# Patient Record
Sex: MALE | Race: WHITE | NOT HISPANIC OR LATINO | Employment: OTHER | URBAN - NONMETROPOLITAN AREA
[De-identification: names, ages, dates, MRNs, and addresses within clinical notes are randomized per-mention and may not be internally consistent; named-entity substitution may affect disease eponyms.]

---

## 2021-06-07 DIAGNOSIS — I10 HYPERTENSION, UNSPECIFIED TYPE: Primary | ICD-10-CM

## 2021-06-16 ENCOUNTER — OFFICE VISIT (OUTPATIENT)
Dept: FAMILY MEDICINE | Facility: CLINIC | Age: 71
End: 2021-06-16
Payer: MEDICARE

## 2021-06-16 VITALS
WEIGHT: 213.63 LBS | TEMPERATURE: 97 F | HEART RATE: 67 BPM | BODY MASS INDEX: 28.93 KG/M2 | HEIGHT: 72 IN | SYSTOLIC BLOOD PRESSURE: 128 MMHG | DIASTOLIC BLOOD PRESSURE: 62 MMHG | RESPIRATION RATE: 18 BRPM | OXYGEN SATURATION: 96 %

## 2021-06-16 DIAGNOSIS — D69.6 THROMBOCYTOPENIA: ICD-10-CM

## 2021-06-16 DIAGNOSIS — I10 HYPERTENSION, UNSPECIFIED TYPE: Primary | ICD-10-CM

## 2021-06-16 DIAGNOSIS — H54.0X55 BLINDNESS RIGHT EYE CATEGORY 5, BLINDNESS LEFT EYE CATEGORY 5: ICD-10-CM

## 2021-06-16 PROCEDURE — 99213 PR OFFICE/OUTPT VISIT, EST, LEVL III, 20-29 MIN: ICD-10-PCS | Mod: ,,, | Performed by: FAMILY MEDICINE

## 2021-06-16 PROCEDURE — 99213 OFFICE O/P EST LOW 20 MIN: CPT | Mod: ,,, | Performed by: FAMILY MEDICINE

## 2021-06-16 RX ORDER — OMEPRAZOLE 20 MG/1
20 CAPSULE, DELAYED RELEASE ORAL DAILY
COMMUNITY
Start: 2021-03-22 | End: 2021-12-16 | Stop reason: SDUPTHER

## 2021-06-16 RX ORDER — FAMOTIDINE 10 MG/1
20 TABLET ORAL DAILY
COMMUNITY
End: 2021-12-16 | Stop reason: SDUPTHER

## 2021-06-16 RX ORDER — ASPIRIN 81 MG/1
81 TABLET ORAL DAILY
COMMUNITY

## 2021-06-16 RX ORDER — ALLOPURINOL 100 MG/1
100 TABLET ORAL 2 TIMES DAILY
COMMUNITY
Start: 2021-03-22 | End: 2021-12-16 | Stop reason: SDUPTHER

## 2021-06-16 RX ORDER — CETIRIZINE HYDROCHLORIDE 10 MG/1
10 TABLET ORAL DAILY
COMMUNITY
End: 2021-12-16 | Stop reason: SDUPTHER

## 2021-06-16 RX ORDER — METOPROLOL SUCCINATE 50 MG/1
1.5 TABLET, EXTENDED RELEASE ORAL DAILY
COMMUNITY
Start: 2021-03-22 | End: 2021-07-01 | Stop reason: SDUPTHER

## 2021-06-16 RX ORDER — ACETAMINOPHEN 500 MG
1 TABLET ORAL DAILY
COMMUNITY

## 2021-06-16 RX ORDER — BRIMONIDINE TARTRATE 1.5 MG/ML
SOLUTION/ DROPS OPHTHALMIC
COMMUNITY
Start: 2021-06-15 | End: 2023-10-10

## 2021-06-16 RX ORDER — ATORVASTATIN CALCIUM 40 MG/1
40 TABLET, FILM COATED ORAL DAILY
COMMUNITY
Start: 2021-05-14 | End: 2021-08-18

## 2021-07-01 RX ORDER — METOPROLOL SUCCINATE 50 MG/1
TABLET, EXTENDED RELEASE ORAL
Qty: 45 TABLET | Refills: 2 | Status: SHIPPED | OUTPATIENT
Start: 2021-07-01 | End: 2021-09-10 | Stop reason: SDUPTHER

## 2021-09-13 RX ORDER — METOPROLOL SUCCINATE 50 MG/1
TABLET, EXTENDED RELEASE ORAL
Qty: 45 TABLET | Refills: 2 | Status: SHIPPED | OUTPATIENT
Start: 2021-09-13 | End: 2021-12-16 | Stop reason: SDUPTHER

## 2021-09-16 ENCOUNTER — OFFICE VISIT (OUTPATIENT)
Dept: FAMILY MEDICINE | Facility: CLINIC | Age: 71
End: 2021-09-16
Payer: MEDICARE

## 2021-09-16 VITALS
BODY MASS INDEX: 28.31 KG/M2 | RESPIRATION RATE: 20 BRPM | SYSTOLIC BLOOD PRESSURE: 114 MMHG | HEIGHT: 72 IN | OXYGEN SATURATION: 100 % | HEART RATE: 74 BPM | WEIGHT: 209 LBS | DIASTOLIC BLOOD PRESSURE: 72 MMHG | TEMPERATURE: 98 F

## 2021-09-16 DIAGNOSIS — Z23 NEED FOR INFLUENZA VACCINATION: ICD-10-CM

## 2021-09-16 DIAGNOSIS — Z00.00 ENCOUNTER FOR SUBSEQUENT ANNUAL WELLNESS VISIT (AWV) IN MEDICARE PATIENT: Primary | ICD-10-CM

## 2021-09-16 DIAGNOSIS — Z12.5 ENCOUNTER FOR SCREENING FOR MALIGNANT NEOPLASM OF PROSTATE: ICD-10-CM

## 2021-09-16 PROCEDURE — 90686 IIV4 VACC NO PRSV 0.5 ML IM: CPT | Mod: ,,, | Performed by: FAMILY MEDICINE

## 2021-09-16 PROCEDURE — G0008 FLU VACCINE (QUAD) GREATER THAN OR EQUAL TO 3YO PRESERVATIVE FREE IM: ICD-10-PCS | Mod: ,,, | Performed by: FAMILY MEDICINE

## 2021-09-16 PROCEDURE — G0439 PPPS, SUBSEQ VISIT: HCPCS | Mod: ,,, | Performed by: FAMILY MEDICINE

## 2021-09-16 PROCEDURE — G0008 ADMIN INFLUENZA VIRUS VAC: HCPCS | Mod: ,,, | Performed by: FAMILY MEDICINE

## 2021-09-16 PROCEDURE — G0103 PSA SCREENING: HCPCS | Mod: ,,, | Performed by: CLINICAL MEDICAL LABORATORY

## 2021-09-16 PROCEDURE — 90686 FLU VACCINE (QUAD) GREATER THAN OR EQUAL TO 3YO PRESERVATIVE FREE IM: ICD-10-PCS | Mod: ,,, | Performed by: FAMILY MEDICINE

## 2021-09-16 PROCEDURE — G0103 PSA, SCREENING: ICD-10-PCS | Mod: ,,, | Performed by: CLINICAL MEDICAL LABORATORY

## 2021-09-16 PROCEDURE — G0439 PR MEDICARE ANNUAL WELLNESS SUBSEQUENT VISIT: ICD-10-PCS | Mod: ,,, | Performed by: FAMILY MEDICINE

## 2021-09-16 RX ORDER — OMEPRAZOLE 20 MG/1
20 CAPSULE, DELAYED RELEASE ORAL DAILY
COMMUNITY
End: 2021-12-16

## 2021-09-16 RX ORDER — VITAMIN E 200 UNIT
1 CAPSULE ORAL DAILY
COMMUNITY

## 2021-09-16 RX ORDER — ASCORBIC ACID 500 MG
500 TABLET ORAL DAILY
COMMUNITY

## 2021-09-16 RX ORDER — CALCIUM CARBONATE/VITAMIN D3 600 MG-10
30 TABLET ORAL DAILY
COMMUNITY

## 2021-09-16 RX ORDER — KETOROLAC TROMETHAMINE 5 MG/ML
1 SOLUTION OPHTHALMIC 4 TIMES DAILY PRN
COMMUNITY
Start: 2021-08-18

## 2021-09-16 RX ORDER — KETOCONAZOLE 20 MG/G
1 CREAM TOPICAL DAILY
COMMUNITY
Start: 2021-04-23 | End: 2021-11-08

## 2021-09-16 RX ORDER — WITCH HAZEL 50 %
2000 PADS, MEDICATED (EA) TOPICAL DAILY
COMMUNITY

## 2021-09-17 LAB — PSA SERPL-MCNC: 1.02 NG/ML (ref 0–4.4)

## 2021-12-16 ENCOUNTER — OFFICE VISIT (OUTPATIENT)
Dept: FAMILY MEDICINE | Facility: CLINIC | Age: 71
End: 2021-12-16
Payer: MEDICARE

## 2021-12-16 VITALS
HEIGHT: 72 IN | OXYGEN SATURATION: 95 % | HEART RATE: 69 BPM | TEMPERATURE: 98 F | DIASTOLIC BLOOD PRESSURE: 69 MMHG | RESPIRATION RATE: 18 BRPM | BODY MASS INDEX: 28.42 KG/M2 | WEIGHT: 209.81 LBS | SYSTOLIC BLOOD PRESSURE: 125 MMHG

## 2021-12-16 DIAGNOSIS — K21.9 GASTROESOPHAGEAL REFLUX DISEASE WITHOUT ESOPHAGITIS: ICD-10-CM

## 2021-12-16 DIAGNOSIS — J30.9 ALLERGIC RHINITIS, UNSPECIFIED SEASONALITY, UNSPECIFIED TRIGGER: ICD-10-CM

## 2021-12-16 DIAGNOSIS — I10 PRIMARY HYPERTENSION: Primary | ICD-10-CM

## 2021-12-16 DIAGNOSIS — M10.9 GOUT, UNSPECIFIED CAUSE, UNSPECIFIED CHRONICITY, UNSPECIFIED SITE: ICD-10-CM

## 2021-12-16 DIAGNOSIS — D69.6 THROMBOCYTOPENIA: ICD-10-CM

## 2021-12-16 DIAGNOSIS — I10 HYPERTENSION, UNSPECIFIED TYPE: ICD-10-CM

## 2021-12-16 DIAGNOSIS — H54.0X55 BLINDNESS RIGHT EYE CATEGORY 5, BLINDNESS LEFT EYE CATEGORY 5: ICD-10-CM

## 2021-12-16 LAB
ALBUMIN SERPL BCP-MCNC: 3.5 G/DL (ref 3.5–5)
ALBUMIN/GLOB SERPL: 1.1 {RATIO}
ALP SERPL-CCNC: 113 U/L (ref 45–115)
ALT SERPL W P-5'-P-CCNC: 26 U/L (ref 16–61)
ANION GAP SERPL CALCULATED.3IONS-SCNC: 8 MMOL/L (ref 7–16)
AST SERPL W P-5'-P-CCNC: 23 U/L (ref 15–37)
BASOPHILS # BLD AUTO: 0.05 K/UL (ref 0–0.2)
BASOPHILS NFR BLD AUTO: 0.7 % (ref 0–1)
BILIRUB SERPL-MCNC: 0.5 MG/DL (ref 0–1.2)
BUN SERPL-MCNC: 14 MG/DL (ref 7–18)
BUN/CREAT SERPL: 14 (ref 6–20)
CALCIUM SERPL-MCNC: 8.5 MG/DL (ref 8.5–10.1)
CHLORIDE SERPL-SCNC: 110 MMOL/L (ref 98–107)
CHOLEST SERPL-MCNC: 150 MG/DL (ref 0–200)
CHOLEST/HDLC SERPL: 6.3 {RATIO}
CO2 SERPL-SCNC: 28 MMOL/L (ref 21–32)
CREAT SERPL-MCNC: 1.01 MG/DL (ref 0.7–1.3)
DIFFERENTIAL METHOD BLD: ABNORMAL
EOSINOPHIL # BLD AUTO: 0.23 K/UL (ref 0–0.5)
EOSINOPHIL NFR BLD AUTO: 3.4 % (ref 1–4)
ERYTHROCYTE [DISTWIDTH] IN BLOOD BY AUTOMATED COUNT: 12.9 % (ref 11.5–14.5)
FOLATE SERPL-MCNC: 10.2 NG/ML (ref 3.1–17.5)
GLOBULIN SER-MCNC: 3.3 G/DL (ref 2–4)
GLUCOSE SERPL-MCNC: 103 MG/DL (ref 74–106)
HCT VFR BLD AUTO: 47.4 % (ref 40–54)
HDLC SERPL-MCNC: 24 MG/DL (ref 40–60)
HGB BLD-MCNC: 15.8 G/DL (ref 13.5–18)
IMM GRANULOCYTES # BLD AUTO: 0.02 K/UL (ref 0–0.04)
IMM GRANULOCYTES NFR BLD: 0.3 % (ref 0–0.4)
LDLC SERPL CALC-MCNC: ABNORMAL MG/DL
LDLC/HDLC SERPL: ABNORMAL {RATIO}
LYMPHOCYTES # BLD AUTO: 2 K/UL (ref 1–4.8)
LYMPHOCYTES NFR BLD AUTO: 29.7 % (ref 27–41)
MCH RBC QN AUTO: 30.7 PG (ref 27–31)
MCHC RBC AUTO-ENTMCNC: 33.3 G/DL (ref 32–36)
MCV RBC AUTO: 92.2 FL (ref 80–96)
MONOCYTES # BLD AUTO: 0.69 K/UL (ref 0–0.8)
MONOCYTES NFR BLD AUTO: 10.2 % (ref 2–6)
MPC BLD CALC-MCNC: 10.9 FL (ref 9.4–12.4)
NEUTROPHILS # BLD AUTO: 3.75 K/UL (ref 1.8–7.7)
NEUTROPHILS NFR BLD AUTO: 55.7 % (ref 53–65)
NONHDLC SERPL-MCNC: 126 MG/DL
NRBC # BLD AUTO: 0 X10E3/UL
NRBC, AUTO (.00): 0 %
PLATELET # BLD AUTO: 156 K/UL (ref 150–400)
POTASSIUM SERPL-SCNC: 4.3 MMOL/L (ref 3.5–5.1)
PROT SERPL-MCNC: 6.8 G/DL (ref 6.4–8.2)
RBC # BLD AUTO: 5.14 M/UL (ref 4.6–6.2)
SODIUM SERPL-SCNC: 142 MMOL/L (ref 136–145)
TRIGL SERPL-MCNC: 435 MG/DL (ref 35–150)
TSH SERPL DL<=0.005 MIU/L-ACNC: 2.82 UIU/ML (ref 0.36–3.74)
VIT B12 SERPL-MCNC: 893 PG/ML (ref 193–986)
VLDLC SERPL-MCNC: ABNORMAL MG/DL
WBC # BLD AUTO: 6.74 K/UL (ref 4.5–11)

## 2021-12-16 PROCEDURE — 82607 VITAMIN B-12: CPT | Mod: ,,, | Performed by: CLINICAL MEDICAL LABORATORY

## 2021-12-16 PROCEDURE — 99214 OFFICE O/P EST MOD 30 MIN: CPT | Mod: ,,, | Performed by: FAMILY MEDICINE

## 2021-12-16 PROCEDURE — 84443 ASSAY THYROID STIM HORMONE: CPT | Mod: ,,, | Performed by: CLINICAL MEDICAL LABORATORY

## 2021-12-16 PROCEDURE — 84443 TSH: ICD-10-PCS | Mod: ,,, | Performed by: CLINICAL MEDICAL LABORATORY

## 2021-12-16 PROCEDURE — 82746 VITAMIN B12/FOLATE, SERUM PANEL: ICD-10-PCS | Mod: ,,, | Performed by: CLINICAL MEDICAL LABORATORY

## 2021-12-16 PROCEDURE — 99214 PR OFFICE/OUTPT VISIT, EST, LEVL IV, 30-39 MIN: ICD-10-PCS | Mod: ,,, | Performed by: FAMILY MEDICINE

## 2021-12-16 PROCEDURE — 80053 COMPREHEN METABOLIC PANEL: CPT | Mod: ,,, | Performed by: CLINICAL MEDICAL LABORATORY

## 2021-12-16 PROCEDURE — 80053 COMPREHENSIVE METABOLIC PANEL: ICD-10-PCS | Mod: ,,, | Performed by: CLINICAL MEDICAL LABORATORY

## 2021-12-16 PROCEDURE — 85025 CBC WITH DIFFERENTIAL: ICD-10-PCS | Mod: ,,, | Performed by: CLINICAL MEDICAL LABORATORY

## 2021-12-16 PROCEDURE — 80061 LIPID PANEL: ICD-10-PCS | Mod: ,,, | Performed by: CLINICAL MEDICAL LABORATORY

## 2021-12-16 PROCEDURE — 82607 VITAMIN B12/FOLATE, SERUM PANEL: ICD-10-PCS | Mod: ,,, | Performed by: CLINICAL MEDICAL LABORATORY

## 2021-12-16 PROCEDURE — 85025 COMPLETE CBC W/AUTO DIFF WBC: CPT | Mod: ,,, | Performed by: CLINICAL MEDICAL LABORATORY

## 2021-12-16 PROCEDURE — 82746 ASSAY OF FOLIC ACID SERUM: CPT | Mod: ,,, | Performed by: CLINICAL MEDICAL LABORATORY

## 2021-12-16 PROCEDURE — 80061 LIPID PANEL: CPT | Mod: ,,, | Performed by: CLINICAL MEDICAL LABORATORY

## 2021-12-16 RX ORDER — ATORVASTATIN CALCIUM 40 MG/1
40 TABLET, FILM COATED ORAL DAILY
Qty: 90 TABLET | Refills: 1 | Status: SHIPPED | OUTPATIENT
Start: 2021-12-16 | End: 2021-12-20 | Stop reason: SDUPTHER

## 2021-12-16 RX ORDER — OMEPRAZOLE 20 MG/1
20 CAPSULE, DELAYED RELEASE ORAL DAILY
Qty: 90 CAPSULE | Refills: 1 | Status: SHIPPED | OUTPATIENT
Start: 2021-12-16 | End: 2022-06-16 | Stop reason: SDUPTHER

## 2021-12-16 RX ORDER — METOPROLOL SUCCINATE 50 MG/1
TABLET, EXTENDED RELEASE ORAL
Qty: 45 TABLET | Refills: 2 | Status: SHIPPED | OUTPATIENT
Start: 2021-12-16 | End: 2022-03-22

## 2021-12-16 RX ORDER — FAMOTIDINE 10 MG/1
20 TABLET ORAL DAILY
Qty: 180 TABLET | Refills: 1 | Status: SHIPPED | OUTPATIENT
Start: 2021-12-16 | End: 2022-06-16 | Stop reason: SDUPTHER

## 2021-12-16 RX ORDER — ALLOPURINOL 100 MG/1
100 TABLET ORAL 2 TIMES DAILY
Qty: 180 TABLET | Refills: 1 | Status: SHIPPED | OUTPATIENT
Start: 2021-12-16 | End: 2022-06-16 | Stop reason: SDUPTHER

## 2021-12-16 RX ORDER — CETIRIZINE HYDROCHLORIDE 10 MG/1
10 TABLET ORAL DAILY
Qty: 90 TABLET | Refills: 1 | Status: SHIPPED | OUTPATIENT
Start: 2021-12-16 | End: 2022-06-16 | Stop reason: SDUPTHER

## 2021-12-20 DIAGNOSIS — I10 PRIMARY HYPERTENSION: ICD-10-CM

## 2021-12-20 RX ORDER — ATORVASTATIN CALCIUM 80 MG/1
80 TABLET, FILM COATED ORAL DAILY
Qty: 90 TABLET | Refills: 1 | Status: SHIPPED | OUTPATIENT
Start: 2021-12-20 | End: 2022-06-16 | Stop reason: SDUPTHER

## 2022-02-16 ENCOUNTER — LAB REQUISITION (OUTPATIENT)
Dept: LAB | Facility: HOSPITAL | Age: 72
End: 2022-02-16
Attending: OPHTHALMOLOGY
Payer: MEDICARE

## 2022-02-16 DIAGNOSIS — D48.9 NEOPLASM OF UNCERTAIN BEHAVIOR, UNSPECIFIED: ICD-10-CM

## 2022-02-16 PROCEDURE — 88305 PATHOLOGY, DERMATOLOGY: ICD-10-PCS | Mod: 26,,, | Performed by: PATHOLOGY

## 2022-02-16 PROCEDURE — 88305 TISSUE EXAM BY PATHOLOGIST: CPT | Mod: SUR | Performed by: OPHTHALMOLOGY

## 2022-02-16 PROCEDURE — 88305 TISSUE EXAM BY PATHOLOGIST: CPT | Mod: 26,,, | Performed by: PATHOLOGY

## 2022-02-17 LAB
ESTROGEN SERPL-MCNC: NORMAL PG/ML
LAB AP GROSS DESCRIPTION: NORMAL
LAB AP LABORATORY NOTES: NORMAL
T3RU NFR SERPL: NORMAL %

## 2022-03-11 DIAGNOSIS — Z71.89 COMPLEX CARE COORDINATION: ICD-10-CM

## 2022-06-16 ENCOUNTER — OFFICE VISIT (OUTPATIENT)
Dept: FAMILY MEDICINE | Facility: CLINIC | Age: 72
End: 2022-06-16
Payer: MEDICARE

## 2022-06-16 VITALS
WEIGHT: 207.38 LBS | HEART RATE: 78 BPM | BODY MASS INDEX: 28.09 KG/M2 | RESPIRATION RATE: 20 BRPM | TEMPERATURE: 98 F | HEIGHT: 72 IN | OXYGEN SATURATION: 95 % | SYSTOLIC BLOOD PRESSURE: 127 MMHG | DIASTOLIC BLOOD PRESSURE: 84 MMHG

## 2022-06-16 DIAGNOSIS — Z23 NEED FOR VACCINATION: ICD-10-CM

## 2022-06-16 DIAGNOSIS — S40.811D ABRASION OF RIGHT UPPER ARM, SUBSEQUENT ENCOUNTER: ICD-10-CM

## 2022-06-16 DIAGNOSIS — J30.9 ALLERGIC RHINITIS, UNSPECIFIED SEASONALITY, UNSPECIFIED TRIGGER: ICD-10-CM

## 2022-06-16 DIAGNOSIS — K21.9 GASTROESOPHAGEAL REFLUX DISEASE WITHOUT ESOPHAGITIS: ICD-10-CM

## 2022-06-16 DIAGNOSIS — M10.9 GOUT, UNSPECIFIED CAUSE, UNSPECIFIED CHRONICITY, UNSPECIFIED SITE: ICD-10-CM

## 2022-06-16 DIAGNOSIS — I10 PRIMARY HYPERTENSION: Primary | ICD-10-CM

## 2022-06-16 DIAGNOSIS — H54.0X55 BLINDNESS RIGHT EYE CATEGORY 5, BLINDNESS LEFT EYE CATEGORY 5: ICD-10-CM

## 2022-06-16 DIAGNOSIS — D69.6 THROMBOCYTOPENIA: ICD-10-CM

## 2022-06-16 LAB
ALBUMIN SERPL BCP-MCNC: 3.7 G/DL (ref 3.5–5)
ALBUMIN/GLOB SERPL: 1.1 {RATIO}
ALP SERPL-CCNC: 102 U/L (ref 45–115)
ALT SERPL W P-5'-P-CCNC: 29 U/L (ref 16–61)
ANION GAP SERPL CALCULATED.3IONS-SCNC: 10 MMOL/L (ref 7–16)
AST SERPL W P-5'-P-CCNC: 23 U/L (ref 15–37)
BILIRUB SERPL-MCNC: 0.9 MG/DL (ref 0–1.2)
BUN SERPL-MCNC: 19 MG/DL (ref 7–18)
BUN/CREAT SERPL: 18 (ref 6–20)
CALCIUM SERPL-MCNC: 9.2 MG/DL (ref 8.5–10.1)
CHLORIDE SERPL-SCNC: 109 MMOL/L (ref 98–107)
CHOLEST SERPL-MCNC: 129 MG/DL (ref 0–200)
CHOLEST/HDLC SERPL: 4.4 {RATIO}
CO2 SERPL-SCNC: 26 MMOL/L (ref 21–32)
CREAT SERPL-MCNC: 1.06 MG/DL (ref 0.7–1.3)
GLOBULIN SER-MCNC: 3.3 G/DL (ref 2–4)
GLUCOSE SERPL-MCNC: 97 MG/DL (ref 74–106)
HDLC SERPL-MCNC: 29 MG/DL (ref 40–60)
LDLC SERPL CALC-MCNC: 53 MG/DL
LDLC/HDLC SERPL: 1.8 {RATIO}
NONHDLC SERPL-MCNC: 100 MG/DL
POTASSIUM SERPL-SCNC: 4.1 MMOL/L (ref 3.5–5.1)
PROT SERPL-MCNC: 7 G/DL (ref 6.4–8.2)
SODIUM SERPL-SCNC: 141 MMOL/L (ref 136–145)
TRIGL SERPL-MCNC: 234 MG/DL (ref 35–150)
VLDLC SERPL-MCNC: 47 MG/DL

## 2022-06-16 PROCEDURE — 80061 LIPID PANEL: CPT | Mod: ,,, | Performed by: CLINICAL MEDICAL LABORATORY

## 2022-06-16 PROCEDURE — 80053 COMPREHEN METABOLIC PANEL: CPT | Mod: ,,, | Performed by: CLINICAL MEDICAL LABORATORY

## 2022-06-16 PROCEDURE — 80053 COMPREHENSIVE METABOLIC PANEL: ICD-10-PCS | Mod: ,,, | Performed by: CLINICAL MEDICAL LABORATORY

## 2022-06-16 PROCEDURE — 99214 PR OFFICE/OUTPT VISIT, EST, LEVL IV, 30-39 MIN: ICD-10-PCS | Mod: ,,, | Performed by: FAMILY MEDICINE

## 2022-06-16 PROCEDURE — 90715 TDAP VACCINE 7 YRS/> IM: CPT | Mod: AT,,, | Performed by: FAMILY MEDICINE

## 2022-06-16 PROCEDURE — 90471 IMMUNIZATION ADMIN: CPT | Mod: AT,,, | Performed by: FAMILY MEDICINE

## 2022-06-16 PROCEDURE — 90715 TDAP VACCINE GREATER THAN OR EQUAL TO 7YO IM: ICD-10-PCS | Mod: AT,,, | Performed by: FAMILY MEDICINE

## 2022-06-16 PROCEDURE — 90471 TDAP VACCINE GREATER THAN OR EQUAL TO 7YO IM: ICD-10-PCS | Mod: AT,,, | Performed by: FAMILY MEDICINE

## 2022-06-16 PROCEDURE — 99214 OFFICE O/P EST MOD 30 MIN: CPT | Mod: ,,, | Performed by: FAMILY MEDICINE

## 2022-06-16 PROCEDURE — 80061 LIPID PANEL: ICD-10-PCS | Mod: ,,, | Performed by: CLINICAL MEDICAL LABORATORY

## 2022-06-16 RX ORDER — ATORVASTATIN CALCIUM 40 MG/1
40 TABLET, FILM COATED ORAL DAILY
Qty: 90 TABLET | Refills: 1 | Status: SHIPPED | OUTPATIENT
Start: 2022-06-16 | End: 2022-11-14

## 2022-06-16 RX ORDER — METOPROLOL SUCCINATE 50 MG/1
50 TABLET, EXTENDED RELEASE ORAL DAILY
Qty: 90 TABLET | Refills: 1 | Status: SHIPPED | OUTPATIENT
Start: 2022-06-16 | End: 2022-11-03 | Stop reason: SDUPTHER

## 2022-06-16 RX ORDER — CETIRIZINE HYDROCHLORIDE 10 MG/1
10 TABLET ORAL DAILY
Qty: 90 TABLET | Refills: 1 | Status: SHIPPED | OUTPATIENT
Start: 2022-06-16 | End: 2023-10-10 | Stop reason: SDUPTHER

## 2022-06-16 RX ORDER — CICLOPIROX 80 MG/ML
SOLUTION TOPICAL NIGHTLY
Qty: 6.6 ML | Refills: 3 | Status: CANCELLED | OUTPATIENT
Start: 2022-06-16

## 2022-06-16 RX ORDER — FAMOTIDINE 10 MG/1
20 TABLET ORAL DAILY
Qty: 180 TABLET | Refills: 1 | Status: SHIPPED | OUTPATIENT
Start: 2022-06-16 | End: 2022-11-14 | Stop reason: SDUPTHER

## 2022-06-16 RX ORDER — ALLOPURINOL 100 MG/1
100 TABLET ORAL 2 TIMES DAILY
Qty: 180 TABLET | Refills: 1 | Status: SHIPPED | OUTPATIENT
Start: 2022-06-16 | End: 2022-11-14 | Stop reason: SDUPTHER

## 2022-06-16 RX ORDER — EFINACONAZOLE 100 MG/ML
SOLUTION TOPICAL
Status: CANCELLED | OUTPATIENT
Start: 2022-06-16

## 2022-06-16 RX ORDER — OMEPRAZOLE 20 MG/1
20 CAPSULE, DELAYED RELEASE ORAL DAILY
Qty: 90 CAPSULE | Refills: 1 | Status: SHIPPED | OUTPATIENT
Start: 2022-06-16 | End: 2022-11-14 | Stop reason: SDUPTHER

## 2022-06-16 NOTE — PROGRESS NOTES
Gayle Davis MD        PATIENT NAME: Mack Moeller  : 1950  DATE: 22  MRN: [id number]      Billing Provider: Gayle Davis MD  Level of Service:   Patient PCP Information     Provider PCP Type    Gayle Davis MD General          Reason for Visit / Chief Complaint: Follow-up (6 months follow up hypertension)       History of Present Illness      Mack Moeller presents to the clinic with Follow-up (6 months follow up hypertension)     Hypertension  This is a chronic problem. The current episode started more than 1 year ago. The problem is unchanged. The problem is controlled. Pertinent negatives include no anxiety, blurred vision, chest pain, headaches, malaise/fatigue, neck pain, orthopnea, palpitations, peripheral edema, PND, shortness of breath or sweats. There are no associated agents to hypertension. Risk factors for coronary artery disease include dyslipidemia. There are no compliance problems.        Review of Systems     Review of Systems   Constitutional: Negative for activity change, appetite change, fatigue, fever and malaise/fatigue.   Eyes: Negative for blurred vision.   Respiratory: Negative for shortness of breath.    Cardiovascular: Negative for chest pain, palpitations, orthopnea and PND.   Musculoskeletal: Negative for neck pain.   Allergic/Immunologic: Positive for environmental allergies.   Neurological: Negative for headaches.   Psychiatric/Behavioral: Negative for agitation, behavioral problems and suicidal ideas.       Medical / Social / Family History     Past Medical History:   Diagnosis Date    Cardiac arrhythmia 2020    Diverticular disease of colon     GERD (gastroesophageal reflux disease)     Hyperlipidemia     Hypertension     Parkinson's disease 2020       Past Surgical History:   Procedure Laterality Date    HEMORRHOID SURGERY         Social History    reports that he has never smoked. He has never used smokeless tobacco. He reports that he  does not drink alcohol and does not use drugs.    Family History  's family history includes Hypertension in his father.    Medications and Allergies     Medications  Outpatient Medications Marked as Taking for the 6/16/22 encounter (Office Visit) with Gayle Davis MD   Medication Sig Dispense Refill    ascorbic acid, vitamin C, (VITAMIN C) 500 MG tablet Take 500 mg by mouth once daily.      aspirin (ECOTRIN) 81 MG EC tablet Take 81 mg by mouth once daily.      brimonidine 0.15 % OPTH DROP (ALPHAGAN) 0.15 % ophthalmic solution       cholecalciferol, vitamin D3, (VITAMIN D3) 50 mcg (2,000 unit) Cap Take 1 capsule by mouth once daily.      cyanocobalamin 2000 MCG tablet Take 2,000 mcg by mouth once daily.      ketorolac 0.5% (ACULAR) 0.5 % Drop Place 1 drop into both eyes 4 (four) times daily as needed.      krill-om-3-dha-epa-phospho-ast (MEGARED OMEGA-3 KRILL OIL) 700-829-68-64 mg Cap Take 1 tablet by mouth once daily.      zinc gluconate 30 mg Tab Take 30 tablets by mouth once daily.      [DISCONTINUED] atorvastatin (LIPITOR) 80 MG tablet Take 1 tablet (80 mg total) by mouth once daily. 90 tablet 1    [DISCONTINUED] ketoconazole (NIZORAL) 2 % cream APPLY TO AFFECTED AREA OF TOES ONCE DAILY 60 g 0    [DISCONTINUED] metoprolol succinate (TOPROL-XL) 50 MG 24 hr tablet TAKE 1/2 (ONE-HALF) TABLET BY MOUTH IN THE MORNING AND IN THE EVENING 45 tablet 0       Allergies  Review of patient's allergies indicates:  No Known Allergies    Physical Examination   /84 (BP Location: Right arm, Patient Position: Sitting, BP Method: Medium (Automatic))   Pulse 78   Temp 97.7 °F (36.5 °C) (Oral)   Resp 20   Ht 6' (1.829 m)   Wt 94.1 kg (207 lb 6.4 oz)   SpO2 95%   BMI 28.13 kg/m²     Physical Exam  Constitutional:       Appearance: Normal appearance. He is normal weight.   Cardiovascular:      Rate and Rhythm: Normal rate and regular rhythm.   Pulmonary:      Effort: Pulmonary effort is normal.      Breath  sounds: Normal breath sounds.   Neurological:      Mental Status: He is alert.   Psychiatric:         Mood and Affect: Mood normal.         Behavior: Behavior normal.           Assessment and Plan (including Health Maintenance)     Plan:         Problem List Items Addressed This Visit        Ophtho    Blindness right eye category 5, blindness left eye category 5       Cardiac/Vascular    Hypertension - Primary    Relevant Medications    metoprolol succinate (TOPROL-XL) 50 MG 24 hr tablet    atorvastatin (LIPITOR) 40 MG tablet    Other Relevant Orders    Comprehensive Metabolic Panel    Lipid Panel      Other Visit Diagnoses     Thrombocytopenia        Allergic rhinitis, unspecified seasonality, unspecified trigger        Relevant Medications    cetirizine (ZYRTEC) 10 MG tablet    Gout, unspecified cause, unspecified chronicity, unspecified site        Relevant Medications    allopurinoL (ZYLOPRIM) 100 MG tablet    Gastroesophageal reflux disease without esophagitis        Relevant Medications    famotidine (PEPCID) 10 MG tablet    omeprazole (PRILOSEC) 20 MG capsule    Need for vaccination        Relevant Orders    (In Office Administered) Tdap Vaccine    Abrasion of right upper arm, subsequent encounter         Relevant Orders    (In Office Administered) Tdap Vaccine            Follow up in about 6 months (around 12/16/2022).        Signature:  Gayle Davis MD      Date of encounter: 6/16/22

## 2022-06-29 ENCOUNTER — OFFICE VISIT (OUTPATIENT)
Dept: FAMILY MEDICINE | Facility: CLINIC | Age: 72
End: 2022-06-29
Payer: MEDICARE

## 2022-06-29 VITALS
HEIGHT: 72 IN | SYSTOLIC BLOOD PRESSURE: 126 MMHG | DIASTOLIC BLOOD PRESSURE: 78 MMHG | TEMPERATURE: 99 F | WEIGHT: 209 LBS | BODY MASS INDEX: 28.31 KG/M2 | OXYGEN SATURATION: 96 % | HEART RATE: 76 BPM

## 2022-06-29 DIAGNOSIS — R05.9 COUGH: ICD-10-CM

## 2022-06-29 DIAGNOSIS — J06.9 UPPER RESPIRATORY TRACT INFECTION, UNSPECIFIED TYPE: Primary | ICD-10-CM

## 2022-06-29 DIAGNOSIS — R09.81 HEAD CONGESTION: ICD-10-CM

## 2022-06-29 PROBLEM — J22 UNSPECIFIED ACUTE LOWER RESPIRATORY INFECTION: Status: ACTIVE | Noted: 2022-06-29

## 2022-06-29 LAB
CTP QC/QA: YES
FLUAV AG NPH QL: NEGATIVE
FLUBV AG NPH QL: NEGATIVE
SARS-COV-2 AG RESP QL IA.RAPID: NEGATIVE

## 2022-06-29 PROCEDURE — 87428 SARSCOV & INF VIR A&B AG IA: CPT | Mod: RHCUB | Performed by: NURSE PRACTITIONER

## 2022-06-29 PROCEDURE — 99213 PR OFFICE/OUTPT VISIT, EST, LEVL III, 20-29 MIN: ICD-10-PCS | Mod: ,,, | Performed by: NURSE PRACTITIONER

## 2022-06-29 PROCEDURE — 96372 THER/PROPH/DIAG INJ SC/IM: CPT | Mod: ,,, | Performed by: NURSE PRACTITIONER

## 2022-06-29 PROCEDURE — 99213 OFFICE O/P EST LOW 20 MIN: CPT | Mod: ,,, | Performed by: NURSE PRACTITIONER

## 2022-06-29 PROCEDURE — 96372 PR INJECTION,THERAP/PROPH/DIAG2ST, IM OR SUBCUT: ICD-10-PCS | Mod: ,,, | Performed by: NURSE PRACTITIONER

## 2022-06-29 RX ORDER — AZITHROMYCIN 250 MG/1
TABLET, FILM COATED ORAL
Qty: 6 TABLET | Refills: 0 | Status: SHIPPED | OUTPATIENT
Start: 2022-06-29 | End: 2022-07-04

## 2022-06-29 RX ORDER — DEXAMETHASONE SODIUM PHOSPHATE 4 MG/ML
4 INJECTION, SOLUTION INTRA-ARTICULAR; INTRALESIONAL; INTRAMUSCULAR; INTRAVENOUS; SOFT TISSUE
Status: COMPLETED | OUTPATIENT
Start: 2022-06-29 | End: 2022-06-29

## 2022-06-29 RX ORDER — CEFTRIAXONE 1 G/1
1 INJECTION, POWDER, FOR SOLUTION INTRAMUSCULAR; INTRAVENOUS
Status: COMPLETED | OUTPATIENT
Start: 2022-06-29 | End: 2022-06-29

## 2022-06-29 RX ADMIN — DEXAMETHASONE SODIUM PHOSPHATE 4 MG: 4 INJECTION, SOLUTION INTRA-ARTICULAR; INTRALESIONAL; INTRAMUSCULAR; INTRAVENOUS; SOFT TISSUE at 03:06

## 2022-06-29 RX ADMIN — CEFTRIAXONE 1 G: 1 INJECTION, POWDER, FOR SOLUTION INTRAMUSCULAR; INTRAVENOUS at 03:06

## 2022-06-30 NOTE — PROGRESS NOTES
KATHIE Finnegan   [facility]  [address] [address] [address]  [phone]      PATIENT NAME: Mack Moeller  : 1950  DATE: 22  MRN: [id number]      Billing Provider: KATHIE Finnegan  Level of Service:   Patient PCP Information     Provider PCP Type    KATHIE Finnegan General          Reason for Visit / Chief Complaint: Diarrhea (X 2 days ), Cough (X 2 days ), Fever, Headache, and Sore Throat       Update PCP  Update Chief Complaint         History of Present Illness / Problem Focused Workflow     71 year old male presents with complaints of diarrhea, cough, congestion, sore throat x 2 days  Recent exposure to covid   States he has taken a home covid test and is negative earlier today  Reports low grade fever     Hx of hypertension, hyperlipidemia, GERD, Parkinsons disease    Review of Systems     Review of Systems   Constitutional: Positive for fever.   HENT: Positive for congestion, rhinorrhea and sore throat. Negative for ear pain.    Respiratory: Positive for cough. Negative for shortness of breath.    Gastrointestinal: Positive for diarrhea. Negative for abdominal pain and nausea.   Genitourinary: Negative for difficulty urinating and dysuria.   Musculoskeletal: Negative for gait problem.   Allergic/Immunologic: Negative for environmental allergies.   Neurological: Negative for dizziness, weakness and light-headedness.   Psychiatric/Behavioral: Negative for agitation and dysphoric mood.       Medical / Social / Family History     Past Medical History:   Diagnosis Date    Cardiac arrhythmia 2020    Diverticular disease of colon     GERD (gastroesophageal reflux disease)     Hyperlipidemia     Hypertension     Parkinson's disease 2020       Past Surgical History:   Procedure Laterality Date    HEMORRHOID SURGERY         Social History    reports that he has never smoked. He has never used smokeless tobacco. He reports  that he does not drink alcohol and does not use drugs.    Family History  Mr.'s family history includes Hypertension in his father.    Medications and Allergies     Medications  Outpatient Medications Marked as Taking for the 6/29/22 encounter (Office Visit) with KATHIE Finnegan   Medication Sig Dispense Refill    allopurinoL (ZYLOPRIM) 100 MG tablet Take 1 tablet (100 mg total) by mouth 2 (two) times daily. 180 tablet 1    ascorbic acid, vitamin C, (VITAMIN C) 500 MG tablet Take 500 mg by mouth once daily.      aspirin (ECOTRIN) 81 MG EC tablet Take 81 mg by mouth once daily.      atorvastatin (LIPITOR) 40 MG tablet Take 1 tablet (40 mg total) by mouth once daily. 90 tablet 1    brimonidine 0.15 % OPTH DROP (ALPHAGAN) 0.15 % ophthalmic solution       cetirizine (ZYRTEC) 10 MG tablet Take 1 tablet (10 mg total) by mouth once daily. 90 tablet 1    cholecalciferol, vitamin D3, (VITAMIN D3) 50 mcg (2,000 unit) Cap Take 1 capsule by mouth once daily.      cyanocobalamin 2000 MCG tablet Take 2,000 mcg by mouth once daily.      efinaconazole (JUBLIA) 10 % Nanda Apply 1 drop topically 2 (two) times a day. 8 mL 3    famotidine (PEPCID) 10 MG tablet Take 2 tablets (20 mg total) by mouth once daily. 180 tablet 1    ketorolac 0.5% (ACULAR) 0.5 % Drop Place 1 drop into both eyes 4 (four) times daily as needed.      krill-om-3-dha-epa-phospho-ast (MEGARED OMEGA-3 KRILL OIL) 534-107-92-64 mg Cap Take 1 tablet by mouth once daily.      metoprolol succinate (TOPROL-XL) 50 MG 24 hr tablet Take 1 tablet (50 mg total) by mouth once daily. 90 tablet 1    omeprazole (PRILOSEC) 20 MG capsule Take 1 capsule (20 mg total) by mouth once daily. 90 capsule 1    zinc gluconate 30 mg Tab Take 30 tablets by mouth once daily.       Current Facility-Administered Medications for the 6/29/22 encounter (Office Visit) with KATHIE Finnegan   Medication Dose Route Frequency Provider Last Rate Last Admin    [COMPLETED] cefTRIAXone  injection 1 g  1 g Intramuscular 1 time in Clinic/HOD Shanta Cason, MICHELEP   1 g at 06/29/22 1504    [COMPLETED] dexamethasone injection 4 mg  4 mg Intramuscular 1 time in Clinic/HOD Shanta Cason FNP   4 mg at 06/29/22 1504       Allergies  Review of patient's allergies indicates:  No Known Allergies    Physical Examination     Vitals:    06/29/22 1345   BP: 126/78   Pulse: 76   Temp: 98.6 °F (37 °C)     Physical Exam  Constitutional:       General: He is not in acute distress.  HENT:      Head: Normocephalic.      Right Ear: Tympanic membrane normal.      Left Ear: Tympanic membrane normal.      Nose: Congestion present. No rhinorrhea.      Mouth/Throat:      Mouth: Mucous membranes are moist.      Pharynx: Posterior oropharyngeal erythema present.   Eyes:      Extraocular Movements: Extraocular movements intact.   Cardiovascular:      Rate and Rhythm: Normal rate.   Pulmonary:      Effort: Pulmonary effort is normal. No respiratory distress.   Abdominal:      General: Bowel sounds are normal.      Palpations: Abdomen is soft.      Tenderness: There is no abdominal tenderness.   Musculoskeletal:         General: Normal range of motion.      Cervical back: Neck supple.   Skin:     General: Skin is warm.   Neurological:      Mental Status: He is alert and oriented to person, place, and time.   Psychiatric:         Behavior: Behavior normal.           Imaging / Labs     Office Visit on 06/29/2022   Component Date Value Ref Range Status    SARS Coronavirus 2 Antigen 06/29/2022 Negative  Negative Final    Rapid Influenza A Ag 06/29/2022 Negative  Negative Final    Rapid Influenza B Ag 06/29/2022 Negative  Negative Final     Acceptable 06/29/2022 Yes   Final     No image results found.      Assessment and Plan (including Health Maintenance)      Problem List  Smart Sets  Document Outside HM   :    Health Maintenance Due   Topic Date Due    COVID-19 Vaccine (3 - Booster) 04/18/2022       Problem List  Items Addressed This Visit    None     Visit Diagnoses     Upper respiratory tract infection, unspecified type    -  Primary    Relevant Medications    dexamethasone injection 4 mg (Completed)    cefTRIAXone injection 1 g (Completed)    azithromycin (Z-CHIRAG) 250 MG tablet    Cough        Relevant Medications    dexamethasone injection 4 mg (Completed)    Other Relevant Orders    POCT SARS-COV2 (COVID) with Flu Antigen (Completed)    Head congestion        Relevant Medications    dexamethasone injection 4 mg (Completed)        Treat for URI today. covid testing negative  Increase fluids/water as tolerated  Tylenol or ibuprofen prn fever  Follow up is symptoms fail to improve    Health Maintenance Topics with due status: Not Due       Topic Last Completion Date    Colorectal Cancer Screening 07/10/2018    PROSTATE-SPECIFIC ANTIGEN 09/16/2021    TETANUS VACCINE 06/16/2022    Lipid Panel 06/16/2022       Future Appointments   Date Time Provider Department Center   9/7/2022 12:30 PM AWV NURSE, [facility]   12/15/2022  8:15 AM Gayle Davis MD [facility]          Signature:  KATHIE Finnegan  [facility]  [address] [place] [id number]  [phone]    Date of encounter: 6/29/22

## 2022-10-09 DIAGNOSIS — Z71.89 COMPLEX CARE COORDINATION: ICD-10-CM

## 2022-11-03 DIAGNOSIS — I10 PRIMARY HYPERTENSION: ICD-10-CM

## 2022-11-03 RX ORDER — METOPROLOL SUCCINATE 50 MG/1
50 TABLET, EXTENDED RELEASE ORAL DAILY
Qty: 30 TABLET | Refills: 0 | Status: SHIPPED | OUTPATIENT
Start: 2022-11-03 | End: 2022-11-14

## 2022-11-14 ENCOUNTER — OFFICE VISIT (OUTPATIENT)
Dept: FAMILY MEDICINE | Facility: CLINIC | Age: 72
End: 2022-11-14
Payer: MEDICARE

## 2022-11-14 VITALS
WEIGHT: 208.06 LBS | DIASTOLIC BLOOD PRESSURE: 72 MMHG | BODY MASS INDEX: 28.18 KG/M2 | TEMPERATURE: 98 F | HEIGHT: 72 IN | SYSTOLIC BLOOD PRESSURE: 122 MMHG | RESPIRATION RATE: 20 BRPM | HEART RATE: 74 BPM | OXYGEN SATURATION: 98 %

## 2022-11-14 DIAGNOSIS — E78.2 MIXED HYPERLIPIDEMIA: ICD-10-CM

## 2022-11-14 DIAGNOSIS — Z00.00 ENCOUNTER FOR INITIAL ANNUAL WELLNESS VISIT (AWV) IN MEDICARE PATIENT: Primary | ICD-10-CM

## 2022-11-14 DIAGNOSIS — H54.0X55 BLINDNESS RIGHT EYE CATEGORY 5, BLINDNESS LEFT EYE CATEGORY 5: ICD-10-CM

## 2022-11-14 DIAGNOSIS — Z12.5 ENCOUNTER FOR SCREENING FOR MALIGNANT NEOPLASM OF PROSTATE: ICD-10-CM

## 2022-11-14 DIAGNOSIS — I10 PRIMARY HYPERTENSION: ICD-10-CM

## 2022-11-14 DIAGNOSIS — Z23 NEED FOR VACCINATION: ICD-10-CM

## 2022-11-14 DIAGNOSIS — M10.9 GOUT, UNSPECIFIED CAUSE, UNSPECIFIED CHRONICITY, UNSPECIFIED SITE: ICD-10-CM

## 2022-11-14 DIAGNOSIS — K21.9 GASTROESOPHAGEAL REFLUX DISEASE WITHOUT ESOPHAGITIS: ICD-10-CM

## 2022-11-14 PROCEDURE — 90694 VACC AIIV4 NO PRSRV 0.5ML IM: CPT | Mod: ,,, | Performed by: NURSE PRACTITIONER

## 2022-11-14 PROCEDURE — G0439 PPPS, SUBSEQ VISIT: HCPCS | Mod: ,,, | Performed by: NURSE PRACTITIONER

## 2022-11-14 PROCEDURE — G0439 PR MEDICARE ANNUAL WELLNESS SUBSEQUENT VISIT: ICD-10-PCS | Mod: ,,, | Performed by: NURSE PRACTITIONER

## 2022-11-14 PROCEDURE — 90694 FLU VACCINE - QUADRIVALENT - ADJUVANTED: ICD-10-PCS | Mod: ,,, | Performed by: NURSE PRACTITIONER

## 2022-11-14 PROCEDURE — G0008 ADMIN INFLUENZA VIRUS VAC: HCPCS | Mod: ,,, | Performed by: NURSE PRACTITIONER

## 2022-11-14 PROCEDURE — G0008 FLU VACCINE - QUADRIVALENT - ADJUVANTED: ICD-10-PCS | Mod: ,,, | Performed by: NURSE PRACTITIONER

## 2022-11-14 RX ORDER — FAMOTIDINE 10 MG/1
20 TABLET ORAL DAILY
Qty: 180 TABLET | Refills: 1 | Status: SHIPPED | OUTPATIENT
Start: 2022-11-14 | End: 2023-05-10

## 2022-11-14 RX ORDER — OMEPRAZOLE 20 MG/1
20 CAPSULE, DELAYED RELEASE ORAL DAILY
Qty: 90 CAPSULE | Refills: 1 | Status: SHIPPED | OUTPATIENT
Start: 2022-11-14 | End: 2023-05-10

## 2022-11-14 RX ORDER — ALLOPURINOL 100 MG/1
100 TABLET ORAL 2 TIMES DAILY
Qty: 180 TABLET | Refills: 1 | Status: SHIPPED | OUTPATIENT
Start: 2022-11-14 | End: 2023-07-03

## 2022-11-14 RX ORDER — ATORVASTATIN CALCIUM 80 MG/1
80 TABLET, FILM COATED ORAL DAILY
Qty: 90 TABLET | Refills: 1 | Status: SHIPPED | OUTPATIENT
Start: 2022-11-14 | End: 2023-05-10

## 2022-11-14 RX ORDER — METOPROLOL SUCCINATE 25 MG/1
TABLET, EXTENDED RELEASE ORAL
Qty: 180 TABLET | Refills: 1 | Status: SHIPPED | OUTPATIENT
Start: 2022-11-14 | End: 2023-08-10

## 2022-11-14 NOTE — PROGRESS NOTES
[employer]     PATIENT NAME: Mack Moeller   : 1950    AGE: 72 y.o. DATE: 2022   MRN: [id number]        Reason for Visit / Chief Complaint: Medicare AWV (Medicare Initial AWV )        Mack Moeller presents for an Inital Medicare AWV today.     The following components were reviewed and updated:    Medical/Social/Family History:  Past Medical History:   Diagnosis Date    Cardiac arrhythmia 2020    Diverticular disease of colon     GERD (gastroesophageal reflux disease)     Hyperlipidemia     Hypertension     Parkinson's disease 2020        Family History   Problem Relation Age of Onset    Hypertension Father         Social History     Tobacco Use   Smoking Status Never   Smokeless Tobacco Never      Social History     Substance and Sexual Activity   Alcohol Use Never       Family History   Problem Relation Age of Onset    Hypertension Father        Past Surgical History:   Procedure Laterality Date    CARDIAC DEFIBRILLATOR PLACEMENT      COLONOSCOPY W/ POLYPECTOMY  07/10/2018    HEMORRHOID SURGERY           Allergies and Current Medications   Review of patient's allergies indicates:  No Known Allergies    Current Outpatient Medications:     allopurinoL (ZYLOPRIM) 100 MG tablet, Take 1 tablet (100 mg total) by mouth 2 (two) times daily., Disp: 180 tablet, Rfl: 1    ascorbic acid, vitamin C, (VITAMIN C) 500 MG tablet, Take 500 mg by mouth once daily., Disp: , Rfl:     aspirin (ECOTRIN) 81 MG EC tablet, Take 81 mg by mouth once daily., Disp: , Rfl:     atorvastatin (LIPITOR) 40 MG tablet, Take 1 tablet (40 mg total) by mouth once daily., Disp: 90 tablet, Rfl: 1    brimonidine 0.15 % OPTH DROP (ALPHAGAN) 0.15 % ophthalmic solution, , Disp: , Rfl:     cetirizine (ZYRTEC) 10 MG tablet, Take 1 tablet (10 mg total) by mouth once daily., Disp: 90 tablet, Rfl: 1    cholecalciferol, vitamin D3, (VITAMIN D3) 50 mcg (2,000 unit) Cap, Take 1 capsule by mouth once  daily., Disp: , Rfl:     cyanocobalamin 2000 MCG tablet, Take 2,000 mcg by mouth once daily., Disp: , Rfl:     famotidine (PEPCID) 10 MG tablet, Take 2 tablets (20 mg total) by mouth once daily., Disp: 180 tablet, Rfl: 1    ketorolac 0.5% (ACULAR) 0.5 % Drop, Place 1 drop into both eyes 4 (four) times daily as needed., Disp: , Rfl:     krill-om-3-dha-epa-phospho-ast (MEGARED OMEGA-3 KRILL OIL) 494-269-69-64 mg Cap, Take 1 tablet by mouth once daily., Disp: , Rfl:     metoprolol succinate (TOPROL-XL) 50 MG 24 hr tablet, Take 1 tablet (50 mg total) by mouth once daily., Disp: 30 tablet, Rfl: 0    omeprazole (PRILOSEC) 20 MG capsule, Take 1 capsule (20 mg total) by mouth once daily., Disp: 90 capsule, Rfl: 1    zinc gluconate 30 mg Tab, Take 30 tablets by mouth once daily., Disp: , Rfl:     efinaconazole (JUBLIA) 10 % Nanda, Apply 1 drop topically 2 (two) times a day. (Patient not taking: Reported on 11/14/2022), Disp: 8 mL, Rfl: 3    Health Risk Assessment   Fall Risk: No   Obesity: BMI 28.22     Advance Directive:  Does not have an advanced directive. Verbal education and written education included in today's AVS.   Depression: PHQ 9 score: 0    HTN:  yes, DASH diet, exercise, weight management, med compliance, home BP monitoring, and follow-up discussed.   T2DM: NA   Tobacco use: Denies  STI: NA    Alcohol misuse: Denies   Statin Use: Yes      Health Risk Assessment  What [age] 70-79  Are you male or female?: Male  During the past four weeks, how much have you been bothered by emotional problems such as feeling anxious, depressed, irritable, sad, or downhearted and blue?: Not at all  During the past five weeks, has your physical and/or emotional health limited your social activities with family, friends, neighbors, or groups?: Not at all  During the past four weeks, how much bodily pain have you generally had?: No pain  During the past four weeks, was someone available to help if you needed and wanted help?:  Yes, as much as I wanted  During the past four weeks, what was the hardest physical activity you could do for at least two minutes?: Heavy  Can you get to places out of walking distance without help?  (For example, can you travel alone on buses or taxis, or drive your own car?): Yes  Can you go shopping for groceries or clothes without someone's help?: Yes  Can you prepare your own meals?: Yes  Can you do your own housework without help?: Yes  Because of any health problems, do you need the help of another person with your personal care needs such as eating, bathing, dressing, or getting around the house?: No  Can you handle your own money without help?: Yes  During the past four weeks, how would you rate your health in general?: Very good  How have things been going for you during the past four weeks?: Very well  Are you having difficulties driving your car?: No  Do you always fasten your seat belt when you are in a car?: Yes, usually  How often in the past four weeks have you been bothered by falling or dizzy when standing up?: Seldom  How often in the past four weeks have you been bothered by sexual problems?: Never  How often in the past four weeks have you been bothered by trouble eating well?: Never  How often in the past four weeks have you been bothered by teeth or denture problems?: Never  How often in the past four weeks have you been bothered with problems using the telephone?: Never  How often in the past four weeks have you been bothered by tiredness or fatigue?: Sometimes  Have you fallen two or more times in the past year?: No  Are you afraid of falling?: No  Are you a smoker?: No  During the past four weeks, how many drinks of wine, beer, or other alcoholic beverages did you have?: No alcohol at all  Do you exercise for about 20 minutes three or more days a week?: Yes, most of the time  Have you been given any information to help you with hazards in your house that might hurt you?: No  Have you been  given any information to help you with keeping track of your medications?: No  How often do you have trouble taking medicines the way you've been told to take them?: I always take them as prescribed  How confident are you that you can control and manage most of your health problems?: Very confident  What is your race? (Check all that apply.):     Health Maintenance   Last eye exam: 2022   Last CV screen with lipids: 06/16/2022   Diabetes screening with fasting glucose or A1c: 06/16/2022   Colonoscopy: 07/10/2018 repeat in 5 years   Flu Vaccine: Due   Pneumonia vaccines: 03/01/2016-13 02/02/2015-23   COVID vaccine: Pfizer 01/19/2021   Hep B vaccine: 02/10/2021   DEXA: 11/18/2021   Last pap/pelvic: NA   Last Mammogram: NA   Last PSA screen: 09/16/2021   AAA screening: NA (once in lifetime for males 65-75 who have smoked > 100 cigarettes in lifetime)  HIV Screeing: NA  Hepatitis C Screen: 08/31/2020  Low Dose CT Scan: NA    Health Maintenance Topics with due status: Not Due       Topic Last Completion Date    Colorectal Cancer Screening 07/10/2018    TETANUS VACCINE 06/16/2022    Lipid Panel 06/16/2022     Health Maintenance Due   Topic Date Due    Shingles Vaccine (1 of 2) Never done    COVID-19 Vaccine (4 - Booster for Pfizer series) 01/13/2022    Influenza Vaccine (1) 09/01/2022    PROSTATE-SPECIFIC ANTIGEN  09/16/2022       Incontinence  Bowel: No  Bladder: No    Lab results available in Epic or see dates from [facility] above:   Lab Results   Component Value Date    CHOL 129 06/16/2022    CHOL 150 12/16/2021    CHOL 139 06/07/2021     Lab Results   Component Value Date    HDL 29 (L) 06/16/2022    HDL 24 (L) 12/16/2021    HDL 34 (L) 06/07/2021     Lab Results   Component Value Date    LDLCALC 53 06/16/2022    LDLCALC  12/16/2021      Comment:      Unable to calculate due to one of the following values:  Cholesterol <5  HDL Cholesterol <5  Triglycerides <10 or >400    LDLCALC 63 06/07/2021     Lab Results    Component Value Date    TRIG 234 (H) 06/16/2022    TRIG 435 (H) 12/16/2021    TRIG 210 (H) 06/07/2021     Lab Results   Component Value Date    CHOLHDL 4.4 06/16/2022    CHOLHDL 6.3 12/16/2021    CHOLHDL 4.1 06/07/2021       No results found for: LABA1C, HGBA1C    Sodium   Date Value Ref Range Status   06/16/2022 141 136 - 145 mmol/L Final     Potassium   Date Value Ref Range Status   06/16/2022 4.1 3.5 - 5.1 mmol/L Final     Chloride   Date Value Ref Range Status   06/16/2022 109 (H) 98 - 107 mmol/L Final     CO2   Date Value Ref Range Status   06/16/2022 26 21 - 32 mmol/L Final     Glucose   Date Value Ref Range Status   06/16/2022 97 74 - 106 mg/dL Final     BUN   Date Value Ref Range Status   06/16/2022 19 (H) 7 - 18 mg/dL Final     Creatinine   Date Value Ref Range Status   06/16/2022 1.06 0.70 - 1.30 mg/dL Final     Calcium   Date Value Ref Range Status   06/16/2022 9.2 8.5 - 10.1 mg/dL Final     Total Protein   Date Value Ref Range Status   06/16/2022 7.0 6.4 - 8.2 g/dL Final     Albumin   Date Value Ref Range Status   06/16/2022 3.7 3.5 - 5.0 g/dL Final     Bilirubin, Total   Date Value Ref Range Status   06/16/2022 0.9 0.0 - 1.2 mg/dL Final     Alk Phos   Date Value Ref Range Status   06/16/2022 102 45 - 115 U/L Final     AST   Date Value Ref Range Status   06/16/2022 23 15 - 37 U/L Final     ALT   Date Value Ref Range Status   06/16/2022 29 16 - 61 U/L Final     Anion Gap   Date Value Ref Range Status   06/16/2022 10 7 - 16 mmol/L Final     eGFR   Date Value Ref Range Status   06/16/2022 73 >=60 mL/min/1.73m² Final         Lab Results   Component Value Date    PSA 1.020 09/16/2021    (Delete this line if female pt)            **See Completed Assessments for Annual Wellness visit within the encounter summary    The following assessments were completed & reviewed:  Depression Screening  Cognitive function Screening  Timed Get Up Test  Whisper Test  Vision Screen  Health Risk Assessment  Checklist of ADLs  and IADLs      Objective  Vitals:    11/14/22 0918   BP: 122/72   Pulse: 74   Resp: 20   Temp: 97.8 °F (36.6 °C)   SpO2: 98%   Weight: 94.4 kg (208 lb 0.6 oz)   Height: 6' (1.829 m)   PainSc: 0-No pain      Body mass index is 28.22 kg/m².  Ideal body weight: 77.6 kg (171 lb 1.2 oz)       Physical Exam  Constitutional:       General: He is not in acute distress.  HENT:      Head: Normocephalic.      Nose: Nose normal.      Mouth/Throat:      Mouth: Mucous membranes are moist.   Eyes:      Extraocular Movements: Extraocular movements intact.   Cardiovascular:      Rate and Rhythm: Normal rate.   Pulmonary:      Effort: Pulmonary effort is normal. No respiratory distress.   Abdominal:      General: Bowel sounds are normal.      Palpations: Abdomen is soft.   Musculoskeletal:         General: Normal range of motion.      Cervical back: Neck supple.   Skin:     General: Skin is warm.   Neurological:      Mental Status: He is alert and oriented to person, place, and time.   Psychiatric:         Behavior: Behavior normal.         Assessment:     1. Encounter for initial annual wellness visit (AWV) in Medicare patient    2. Need for vaccination    3. Body mass index 28.0-28.9, adult         Plan:    Referrals:   PSA, Flu Vaccine    Advised to call office if does not hear from anyone with referral appt within 2-3 weeks to check on status of referral. Voiced understanding.        Discussed and provided with a screening schedule and personal prevention plan in accordance with USPSTF age appropriate recommendations and Medicare screening guidelines.   Education, counseling, and referrals were provided as needed.  After Visit Summary printed and given to patient which includes written education and a list of any referrals if indicated.     Education including advanced directives, diet, exercise, falls, and preventive health discussed with patient and patient verbalized understanding.      F/u plan for yearly  AWV.    Signature:

## 2022-12-05 DIAGNOSIS — E78.2 MIXED HYPERLIPIDEMIA: Primary | ICD-10-CM

## 2022-12-05 RX ORDER — GEMFIBROZIL 600 MG/1
600 TABLET, FILM COATED ORAL
Qty: 180 TABLET | Refills: 1 | Status: SHIPPED | OUTPATIENT
Start: 2022-12-05 | End: 2022-12-05

## 2023-01-09 ENCOUNTER — OFFICE VISIT (OUTPATIENT)
Dept: FAMILY MEDICINE | Facility: CLINIC | Age: 73
End: 2023-01-09
Payer: MEDICARE

## 2023-01-09 VITALS
RESPIRATION RATE: 20 BRPM | BODY MASS INDEX: 27.77 KG/M2 | SYSTOLIC BLOOD PRESSURE: 136 MMHG | HEIGHT: 72 IN | TEMPERATURE: 98 F | DIASTOLIC BLOOD PRESSURE: 82 MMHG | HEART RATE: 71 BPM | OXYGEN SATURATION: 96 % | WEIGHT: 205 LBS

## 2023-01-09 DIAGNOSIS — J32.9 SINUSITIS, UNSPECIFIED CHRONICITY, UNSPECIFIED LOCATION: Primary | ICD-10-CM

## 2023-01-09 DIAGNOSIS — R05.1 ACUTE COUGH: ICD-10-CM

## 2023-01-09 LAB
CTP QC/QA: YES
CTP QC/QA: YES
FLUAV AG NPH QL: NEGATIVE
FLUBV AG NPH QL: NEGATIVE
SARS-COV-2 AG RESP QL IA.RAPID: NEGATIVE

## 2023-01-09 PROCEDURE — 96372 THER/PROPH/DIAG INJ SC/IM: CPT | Mod: ,,, | Performed by: NURSE PRACTITIONER

## 2023-01-09 PROCEDURE — 96372 PR INJECTION,THERAP/PROPH/DIAG2ST, IM OR SUBCUT: ICD-10-PCS | Mod: ,,, | Performed by: NURSE PRACTITIONER

## 2023-01-09 PROCEDURE — 99213 PR OFFICE/OUTPT VISIT, EST, LEVL III, 20-29 MIN: ICD-10-PCS | Mod: ,,, | Performed by: NURSE PRACTITIONER

## 2023-01-09 PROCEDURE — 87804 INFLUENZA ASSAY W/OPTIC: CPT | Mod: RHCUB | Performed by: NURSE PRACTITIONER

## 2023-01-09 PROCEDURE — 99213 OFFICE O/P EST LOW 20 MIN: CPT | Mod: ,,, | Performed by: NURSE PRACTITIONER

## 2023-01-09 PROCEDURE — 87426 SARSCOV CORONAVIRUS AG IA: CPT | Mod: RHCUB | Performed by: NURSE PRACTITIONER

## 2023-01-09 RX ORDER — PROMETHAZINE HYDROCHLORIDE AND DEXTROMETHORPHAN HYDROBROMIDE 6.25; 15 MG/5ML; MG/5ML
5 SYRUP ORAL NIGHTLY PRN
Qty: 180 ML | Refills: 0 | Status: SHIPPED | OUTPATIENT
Start: 2023-01-09 | End: 2023-01-19

## 2023-01-09 RX ORDER — AMOXICILLIN 500 MG/1
500 CAPSULE ORAL EVERY 12 HOURS
Qty: 20 CAPSULE | Refills: 0 | Status: SHIPPED | OUTPATIENT
Start: 2023-01-09 | End: 2023-01-19

## 2023-01-09 RX ORDER — CEFTRIAXONE 1 G/1
1 INJECTION, POWDER, FOR SOLUTION INTRAMUSCULAR; INTRAVENOUS ONCE
Status: COMPLETED | OUTPATIENT
Start: 2023-01-09 | End: 2023-01-09

## 2023-01-09 RX ORDER — DEXAMETHASONE SODIUM PHOSPHATE 4 MG/ML
4 INJECTION, SOLUTION INTRA-ARTICULAR; INTRALESIONAL; INTRAMUSCULAR; INTRAVENOUS; SOFT TISSUE
Status: COMPLETED | OUTPATIENT
Start: 2023-01-09 | End: 2023-01-09

## 2023-01-09 RX ADMIN — CEFTRIAXONE 1 G: 1 INJECTION, POWDER, FOR SOLUTION INTRAMUSCULAR; INTRAVENOUS at 09:01

## 2023-01-09 RX ADMIN — DEXAMETHASONE SODIUM PHOSPHATE 4 MG: 4 INJECTION, SOLUTION INTRA-ARTICULAR; INTRALESIONAL; INTRAMUSCULAR; INTRAVENOUS; SOFT TISSUE at 09:01

## 2023-01-10 NOTE — ASSESSMENT & PLAN NOTE
Injection given in clinic  Started patient on azithromycin- discussed use and side effects  Increase fluids, wash hands often and pop ears as able  otc antihistamine as needed   Phenergan dm- discussed use and side effects

## 2023-01-10 NOTE — PROGRESS NOTES
KATHIE Valdivia   [place] [address]  [place]  [address]      PATIENT NAME: Mack Moeller  : 1950  DATE: 23  MRN: [id number]      Billing Provider: KATHIE Valdivia  Level of Service: ND OFFICE/OUTPT VISIT, EST, LEVL III, 20-29 MIN  Patient PCP Information       Provider PCP Type    KATHIE Finnegan [facility]            Reason for Visit / Chief Complaint: Nasal Congestion, Sore Throat, Cough, Headache, and Generalized Body Aches (All symptoms X4 days)       Update PCP  Update Chief Complaint         History of Present Illness / Problem Focused Workflow     Mack Moeller presents to the clinic with Nasal Congestion, Sore Throat, Cough, Headache, and Generalized Body Aches (All symptoms X4 days)     Patient presents to clinic with c/o cough, congestion, drainage, fatigue x 2-3 days.       Review of Systems     @Review of Systems   Constitutional:  Positive for fatigue.   HENT:  Positive for nasal congestion, postnasal drip, rhinorrhea and sinus pressure/congestion. Negative for ear pain.    Respiratory:  Negative for cough.    Cardiovascular:  Negative for chest pain.   Neurological:  Positive for headaches.     Medical / Social / Family History     Past Medical History:   Diagnosis Date    Cardiac arrhythmia 2020    Diverticular disease of colon     GERD (gastroesophageal reflux disease)     Hyperlipidemia     Hypertension     Parkinson's disease 2020       Past Surgical History:   Procedure Laterality Date    CARDIAC DEFIBRILLATOR PLACEMENT      COLONOSCOPY W/ POLYPECTOMY  07/10/2018    HEMORRHOID SURGERY         Social History    reports that he has never smoked. He has never used smokeless tobacco. He reports that he does not drink alcohol and does not use drugs.    Family History  's family history includes Hypertension in his father.    Medications and Allergies     Medications  Outpatient Medications Marked as Taking for the 23 encounter (Office  Visit) with KATHIE Valdivia   Medication Sig Dispense Refill    allopurinoL (ZYLOPRIM) 100 MG tablet Take 1 tablet (100 mg total) by mouth 2 (two) times daily. 180 tablet 1    ascorbic acid, vitamin C, (VITAMIN C) 500 MG tablet Take 500 mg by mouth once daily.      aspirin (ECOTRIN) 81 MG EC tablet Take 81 mg by mouth once daily.      atorvastatin (LIPITOR) 80 MG tablet Take 1 tablet (80 mg total) by mouth once daily. 90 tablet 1    brimonidine 0.15 % OPTH DROP (ALPHAGAN) 0.15 % ophthalmic solution       cholecalciferol, vitamin D3, (VITAMIN D3) 50 mcg (2,000 unit) Cap Take 1 capsule by mouth once daily.      cyanocobalamin 2000 MCG tablet Take 2,000 mcg by mouth once daily.      famotidine (PEPCID) 10 MG tablet Take 2 tablets (20 mg total) by mouth once daily. 180 tablet 1    ketorolac 0.5% (ACULAR) 0.5 % Drop Place 1 drop into both eyes 4 (four) times daily as needed.      krill-om-3-dha-epa-phospho-ast (MEGARED OMEGA-3 KRILL OIL) 650-478-95-64 mg Cap Take 1 tablet by mouth once daily.      metoprolol succinate (TOPROL-XL) 25 MG 24 hr tablet 1/2 tab in the morning and 1 tab at bedtime 180 tablet 1    omeprazole (PRILOSEC) 20 MG capsule Take 1 capsule (20 mg total) by mouth once daily. 90 capsule 1    zinc gluconate 30 mg Tab Take 30 tablets by mouth once daily.       Current Facility-Administered Medications for the 1/9/23 encounter (Office Visit) with KATHIE Valdivia   Medication Dose Route Frequency Provider Last Rate Last Admin    [COMPLETED] cefTRIAXone injection 1 g  1 g Intramuscular Once KATHIE Valdivia   1 g at 01/09/23 0943    [COMPLETED] dexAMETHasone injection 4 mg  4 mg Intramuscular 1 time in Clinic/HOD KATHIE Valdivia   4 mg at 01/09/23 0943       Allergies  Review of patient's allergies indicates:  No Known Allergies    Physical Examination     Vitals:    01/09/23 0820   BP: 136/82   Pulse: 71   Resp: 20   Temp: 98.2 °F (36.8 °C)     Physical Exam  Constitutional:       General: He is not in  acute distress.     Appearance: Normal appearance.   HENT:      Right Ear: Tympanic membrane is erythematous and bulging.      Left Ear: Tympanic membrane is erythematous and bulging.      Mouth/Throat:      Pharynx: Posterior oropharyngeal erythema present.   Cardiovascular:      Rate and Rhythm: Normal rate and regular rhythm.   Pulmonary:      Effort: Pulmonary effort is normal. No respiratory distress.      Breath sounds: Normal breath sounds. No wheezing, rhonchi or rales.   Skin:     General: Skin is warm and dry.   Neurological:      Mental Status: He is alert.   Psychiatric:         Mood and Affect: Mood normal.         Behavior: Behavior normal.             Lab Results   Component Value Date    WBC 6.77 12/01/2022    HGB 15.1 12/01/2022    HCT 45.9 12/01/2022    MCV 92.9 12/01/2022     12/01/2022          Sodium   Date Value Ref Range Status   12/01/2022 142 136 - 145 mmol/L Final     Potassium   Date Value Ref Range Status   12/01/2022 4.5 3.5 - 5.1 mmol/L Final     Chloride   Date Value Ref Range Status   12/01/2022 108 (H) 98 - 107 mmol/L Final     CO2   Date Value Ref Range Status   12/01/2022 28 21 - 32 mmol/L Final     Glucose   Date Value Ref Range Status   12/01/2022 105 74 - 106 mg/dL Final     BUN   Date Value Ref Range Status   12/01/2022 14 7 - 18 mg/dL Final     Creatinine   Date Value Ref Range Status   12/01/2022 1.02 0.70 - 1.30 mg/dL Final     Calcium   Date Value Ref Range Status   12/01/2022 9.1 8.5 - 10.1 mg/dL Final     Total Protein   Date Value Ref Range Status   12/01/2022 7.0 6.4 - 8.2 g/dL Final     Albumin   Date Value Ref Range Status   12/01/2022 3.6 3.5 - 5.0 g/dL Final     Bilirubin, Total   Date Value Ref Range Status   12/01/2022 0.7 >0.0 - 1.2 mg/dL Final     Alk Phos   Date Value Ref Range Status   12/01/2022 109 45 - 115 U/L Final     AST   Date Value Ref Range Status   12/01/2022 24 15 - 37 U/L Final     ALT   Date Value Ref Range Status   12/01/2022 30 16 - 61  U/L Final     Anion Gap   Date Value Ref Range Status   12/01/2022 11 7 - 16 mmol/L Final     eGFR   Date Value Ref Range Status   06/16/2022 73 >=60 mL/min/1.73m² Final      No image results found.     Procedures   Assessment and Plan (including Health Maintenance)      Problem List  Smart Sets  Document Outside HM   :    Plan:           Problem List Items Addressed This Visit          ENT    Sinusitis - Primary    Current Assessment & Plan     Injection given in clinic  Started patient on azithromycin- discussed use and side effects  Increase fluids, wash hands often and pop ears as able  otc antihistamine as needed   Phenergan dm- discussed use and side effects          Relevant Medications    cefTRIAXone injection 1 g (Completed)    dexAMETHasone injection 4 mg (Completed)    amoxicillin (AMOXIL) 500 MG capsule    promethazine-dextromethorphan (PROMETHAZINE-DM) 6.25-15 mg/5 mL Syrp       Pulmonary    Acute cough    Relevant Orders    SARS Coronavirus 2 Antigen, POCT (Completed)    POCT Influenza A/B (Completed)       Health Maintenance Topics with due status: Not Due       Topic Last Completion Date    Colorectal Cancer Screening 07/10/2018    TETANUS VACCINE 06/16/2022    PROSTATE-SPECIFIC ANTIGEN 12/01/2022    Lipid Panel 12/01/2022       Future Appointments   Date Time Provider Department Center   11/20/2023  9:00 AM AWV NURSE, [facility]        Health Maintenance Due   Topic Date Due    Shingles Vaccine (1 of 2) Never done    COVID-19 Vaccine (4 - Booster for Pfizer series) 01/13/2022        Follow up if symptoms worsen or fail to improve.     Signature:  KATHIE Valdivia  [address] [place]  [address]    Date of encounter: 1/9/23

## 2023-05-09 DIAGNOSIS — Z71.89 COMPLEX CARE COORDINATION: ICD-10-CM

## 2023-05-10 ENCOUNTER — OFFICE VISIT (OUTPATIENT)
Dept: FAMILY MEDICINE | Facility: CLINIC | Age: 73
End: 2023-05-10
Payer: MEDICARE

## 2023-05-10 VITALS
TEMPERATURE: 97 F | SYSTOLIC BLOOD PRESSURE: 130 MMHG | DIASTOLIC BLOOD PRESSURE: 82 MMHG | HEIGHT: 72 IN | OXYGEN SATURATION: 98 % | HEART RATE: 71 BPM | RESPIRATION RATE: 18 BRPM | WEIGHT: 205 LBS | BODY MASS INDEX: 27.77 KG/M2

## 2023-05-10 DIAGNOSIS — I10 PRIMARY HYPERTENSION: ICD-10-CM

## 2023-05-10 DIAGNOSIS — Z12.11 SCREENING FOR COLON CANCER: ICD-10-CM

## 2023-05-10 DIAGNOSIS — E78.2 MIXED HYPERLIPIDEMIA: Primary | ICD-10-CM

## 2023-05-10 PROCEDURE — 99214 OFFICE O/P EST MOD 30 MIN: CPT | Mod: ,,, | Performed by: NURSE PRACTITIONER

## 2023-05-10 PROCEDURE — 99214 PR OFFICE/OUTPT VISIT, EST, LEVL IV, 30-39 MIN: ICD-10-PCS | Mod: ,,, | Performed by: NURSE PRACTITIONER

## 2023-05-10 RX ORDER — ZINC SULFATE 66 MG
1 TABLET ORAL
COMMUNITY
Start: 2023-02-13

## 2023-05-10 RX ORDER — FAMOTIDINE 20 MG/1
20 TABLET, FILM COATED ORAL
COMMUNITY
Start: 2023-02-13 | End: 2023-10-10 | Stop reason: SDUPTHER

## 2023-05-10 RX ORDER — EZETIMIBE 10 MG/1
10 TABLET ORAL DAILY
Qty: 90 TABLET | Refills: 1 | Status: SHIPPED | OUTPATIENT
Start: 2023-05-10 | End: 2023-08-14 | Stop reason: ALTCHOICE

## 2023-05-10 NOTE — PROGRESS NOTES
Reviewed care gaps with pt.   Health Maintenance Due   Topic Date Due    Shingles Vaccine (1 of 2) Never done    COVID-19 Vaccine (4 - Booster for Pfizer series) 01/13/2022    Colorectal Cancer Screening  07/10/2023     Placed order for referral for colonoscopy.   Pt refused all vaccines at this time.

## 2023-05-17 PROBLEM — E78.2 MIXED HYPERLIPIDEMIA: Status: ACTIVE | Noted: 2023-05-17

## 2023-05-17 PROBLEM — Z12.11 SCREENING FOR COLON CANCER: Status: ACTIVE | Noted: 2023-05-17

## 2023-05-17 NOTE — PROGRESS NOTES
KATHIE Finnegan   [facility]  [id number] [place] [id number]  [phone]      PATIENT NAME: Mack Moeller  : 1950  DATE: 5/10/23  MRN: [id number]      Billing Provider: KATHIE Finnegan  Level of Service:   Patient PCP Information       Provider PCP Type    KATHIE Finnegan General            Reason for Visit / Chief Complaint: Pain (Pt states he is having pain described as pressure in his R quad, both shoulders and R wrist. /States he saw cardiologist Dr. Rodgers in [place] about 6 weeks ago and states he thinks his cholesterol could be causing his aches. /All symptoms X 6 weeks. /Pt atorvastatin was increased from 40mg to 80mg )       Update PCP  Update Chief Complaint         History of Present Illness / Problem Focused Workflow     71 year old male presents with complaints of of having muscle aches in several areas of body x 6 weeks  Cardiologist suggested it was related to his atorvastatin  Atorvastatin was increased due to elevated levels around        Hx of hypertension, hyperlipidemia, GERD, Parkinsons disease    Review of Systems     Review of Systems   Constitutional:  Negative for fever.   HENT:  Negative for congestion.    Respiratory:  Negative for cough and shortness of breath.    Gastrointestinal:  Negative for abdominal pain, diarrhea and nausea.   Genitourinary:  Negative for difficulty urinating and dysuria.   Musculoskeletal:  Positive for arthralgias and myalgias. Negative for gait problem and joint swelling.   Allergic/Immunologic: Negative for environmental allergies.   Neurological:  Negative for dizziness, weakness and light-headedness.   Psychiatric/Behavioral:  Negative for agitation and dysphoric mood.      Medical / Social / Family History     Past Medical History:   Diagnosis Date    Cardiac arrhythmia 2020    Diverticular disease of colon     GERD (gastroesophageal reflux disease)     Hyperlipidemia      Hypertension     Parkinson's disease 2020       Past Surgical History:   Procedure Laterality Date    CARDIAC DEFIBRILLATOR PLACEMENT      COLONOSCOPY W/ POLYPECTOMY  07/10/2018    HEMORRHOID SURGERY         Social History    reports that he has never smoked. He has never used smokeless tobacco. He reports that he does not drink alcohol and does not use drugs.    Family History  's family history includes Hypertension in his father.    Medications and Allergies     Medications  Outpatient Medications Marked as Taking for the 5/10/23 encounter (Office Visit) with KATHIE Finnegan   Medication Sig Dispense Refill    [] allopurinoL (ZYLOPRIM) 100 MG tablet Take 1 tablet (100 mg total) by mouth 2 (two) times daily. 180 tablet 1    ascorbic acid, vitamin C, (VITAMIN C) 500 MG tablet Take 500 mg by mouth once daily.      aspirin (ECOTRIN) 81 MG EC tablet Take 81 mg by mouth once daily.      brimonidine 0.15 % OPTH DROP (ALPHAGAN) 0.15 % ophthalmic solution       cetirizine (ZYRTEC) 10 MG tablet Take 1 tablet (10 mg total) by mouth once daily. 90 tablet 1    cholecalciferol, vitamin D3, (VITAMIN D3) 50 mcg (2,000 unit) Cap Take 1 capsule by mouth once daily.      cyanocobalamin 2000 MCG tablet Take 2,000 mcg by mouth once daily.      famotidine (PEPCID) 20 MG tablet Take 20 mg by mouth.      ketorolac 0.5% (ACULAR) 0.5 % Drop Place 1 drop into both eyes 4 (four) times daily as needed.      krill-om-3-dha-epa-phospho-ast (MEGARED OMEGA-3 KRILL OIL) 491-404-93-64 mg Cap Take 1 tablet by mouth once daily.      metoprolol succinate (TOPROL-XL) 25 MG 24 hr tablet 1/2 tab in the morning and 1 tab at bedtime 180 tablet 1    zinc gluconate 30 mg Tab Take 30 tablets by mouth once daily.      [DISCONTINUED] atorvastatin (LIPITOR) 80 MG tablet Take 1 tablet (80 mg total) by mouth once daily. 90 tablet 1       Allergies  Review of patient's allergies indicates:  No Known Allergies    Physical Examination      Vitals:    05/10/23 1546   BP: 130/82   Pulse: 71   Resp: 18   Temp: 97 °F (36.1 °C)     Physical Exam  Constitutional:       General: He is not in acute distress.  HENT:      Head: Normocephalic.      Nose: Nose normal. No congestion or rhinorrhea.      Mouth/Throat:      Mouth: Mucous membranes are moist.   Eyes:      Extraocular Movements: Extraocular movements intact.   Cardiovascular:      Rate and Rhythm: Normal rate.   Pulmonary:      Effort: Pulmonary effort is normal. No respiratory distress.   Abdominal:      General: Bowel sounds are normal.      Palpations: Abdomen is soft.      Tenderness: There is no abdominal tenderness.   Musculoskeletal:         General: Tenderness present. No swelling or signs of injury. Normal range of motion.      Cervical back: Neck supple.   Skin:     General: Skin is warm.   Neurological:      Mental Status: He is alert and oriented to person, place, and time.   Psychiatric:         Behavior: Behavior normal.         Imaging / Labs     No visits with results within 1 Day(s) from this visit.   Latest known visit with results is:   Office Visit on 01/09/2023   Component Date Value Ref Range Status    SARS Coronavirus 2 Antigen 01/09/2023 Negative  Negative Final     Acceptable 01/09/2023 Yes   Final    Rapid Influenza A Ag 01/09/2023 Negative  Negative Final    Rapid Influenza B Ag 01/09/2023 Negative  Negative Final     Acceptable 01/09/2023 Yes   Final     No image results found.      Assessment and Plan (including Health Maintenance)      Problem List  Smart Sets  Document Outside HM   :    Health Maintenance Due   Topic Date Due    Shingles Vaccine (1 of 2) Never done    COVID-19 Vaccine (4 - Booster for Pfizer series) 01/13/2022    Colorectal Cancer Screening  07/10/2023       Problem List Items Addressed This Visit          Cardiac/Vascular    Primary hypertension    Current Assessment & Plan     Condition us stable           Mixed  hyperlipidemia - Primary    Current Assessment & Plan     Stop atorvastatin and start zetia  Discussed labs    Follow up 3 mo for lipid panel           Relevant Medications    ezetimibe (ZETIA) 10 mg tablet       GI    Screening for colon cancer    Current Assessment & Plan     Referral for colonoscopy           Relevant Orders    Ambulatory referral/consult to Gastroenterology       Health Maintenance Topics with due status: Not Due       Topic Last Completion Date    TETANUS VACCINE 06/16/2022    PROSTATE-SPECIFIC ANTIGEN 12/01/2022    Lipid Panel 12/01/2022       Future Appointments   Date Time Provider Department Center   8/10/2023  8:40 AM KATHIE Finnegan [facility]   11/20/2023  9:00 AM AWV NURSE, [facility]IC[facility]          Signature:  KATHIE Finnegan  [facility] - [facility]  [address] [place] [id number]  [phone]    Date of encounter: 5/10/23    Reviewed the chart and agree with assessment and plan.  Ishmael Lazaro, DO

## 2023-05-31 ENCOUNTER — OFFICE VISIT (OUTPATIENT)
Dept: FAMILY MEDICINE | Facility: CLINIC | Age: 73
End: 2023-05-31
Payer: MEDICARE

## 2023-05-31 VITALS
RESPIRATION RATE: 18 BRPM | BODY MASS INDEX: 27.39 KG/M2 | HEART RATE: 86 BPM | HEIGHT: 72 IN | TEMPERATURE: 97 F | SYSTOLIC BLOOD PRESSURE: 136 MMHG | OXYGEN SATURATION: 96 % | DIASTOLIC BLOOD PRESSURE: 82 MMHG | WEIGHT: 202.19 LBS

## 2023-05-31 DIAGNOSIS — M79.10 MYALGIA: Primary | ICD-10-CM

## 2023-05-31 DIAGNOSIS — R79.82 ELEVATED C-REACTIVE PROTEIN (CRP): ICD-10-CM

## 2023-05-31 LAB
ALBUMIN SERPL BCP-MCNC: 3.7 G/DL (ref 3.5–5)
ALBUMIN/GLOB SERPL: 1.1 {RATIO}
ALP SERPL-CCNC: 108 U/L (ref 45–115)
ALT SERPL W P-5'-P-CCNC: 27 U/L (ref 16–61)
ANION GAP SERPL CALCULATED.3IONS-SCNC: 9 MMOL/L (ref 7–16)
AST SERPL W P-5'-P-CCNC: 24 U/L (ref 15–37)
BASOPHILS # BLD AUTO: 0.03 K/UL (ref 0–0.2)
BASOPHILS NFR BLD AUTO: 0.3 % (ref 0–1)
BILIRUB SERPL-MCNC: 0.8 MG/DL (ref ?–1.2)
BUN SERPL-MCNC: 11 MG/DL (ref 7–18)
BUN/CREAT SERPL: 10 (ref 6–20)
CALCIUM SERPL-MCNC: 9.5 MG/DL (ref 8.5–10.1)
CHLORIDE SERPL-SCNC: 107 MMOL/L (ref 98–107)
CO2 SERPL-SCNC: 29 MMOL/L (ref 21–32)
CREAT SERPL-MCNC: 1.06 MG/DL (ref 0.7–1.3)
DIFFERENTIAL METHOD BLD: ABNORMAL
EGFR (NO RACE VARIABLE) (RUSH/TITUS): 74 ML/MIN/1.73M2
EOSINOPHIL # BLD AUTO: 0.16 K/UL (ref 0–0.5)
EOSINOPHIL NFR BLD AUTO: 1.8 % (ref 1–4)
ERYTHROCYTE [DISTWIDTH] IN BLOOD BY AUTOMATED COUNT: 13.2 % (ref 11.5–14.5)
GLOBULIN SER-MCNC: 3.5 G/DL (ref 2–4)
GLUCOSE SERPL-MCNC: 99 MG/DL (ref 74–106)
HCT VFR BLD AUTO: 48.6 % (ref 40–54)
HGB BLD-MCNC: 15.3 G/DL (ref 13.5–18)
IMM GRANULOCYTES # BLD AUTO: 0.02 K/UL (ref 0–0.04)
IMM GRANULOCYTES NFR BLD: 0.2 % (ref 0–0.4)
LYMPHOCYTES # BLD AUTO: 1.52 K/UL (ref 1–4.8)
LYMPHOCYTES NFR BLD AUTO: 16.9 % (ref 27–41)
MCH RBC QN AUTO: 29.5 PG (ref 27–31)
MCHC RBC AUTO-ENTMCNC: 31.5 G/DL (ref 32–36)
MCV RBC AUTO: 93.6 FL (ref 80–96)
MONOCYTES # BLD AUTO: 0.85 K/UL (ref 0–0.8)
MONOCYTES NFR BLD AUTO: 9.5 % (ref 2–6)
MPC BLD CALC-MCNC: 10.9 FL (ref 9.4–12.4)
NEUTROPHILS # BLD AUTO: 6.4 K/UL (ref 1.8–7.7)
NEUTROPHILS NFR BLD AUTO: 71.3 % (ref 53–65)
NRBC # BLD AUTO: 0 X10E3/UL
NRBC, AUTO (.00): 0 %
PLATELET # BLD AUTO: 186 K/UL (ref 150–400)
POTASSIUM SERPL-SCNC: 4.6 MMOL/L (ref 3.5–5.1)
PROT SERPL-MCNC: 7.2 G/DL (ref 6.4–8.2)
RBC # BLD AUTO: 5.19 M/UL (ref 4.6–6.2)
SODIUM SERPL-SCNC: 140 MMOL/L (ref 136–145)
URATE SERPL-MCNC: 4.5 MG/DL (ref 3.5–7.2)
WBC # BLD AUTO: 8.98 K/UL (ref 4.5–11)

## 2023-05-31 PROCEDURE — 99213 OFFICE O/P EST LOW 20 MIN: CPT | Mod: ,,, | Performed by: NURSE PRACTITIONER

## 2023-05-31 PROCEDURE — 85025 CBC WITH DIFFERENTIAL: ICD-10-PCS | Mod: ,,, | Performed by: CLINICAL MEDICAL LABORATORY

## 2023-05-31 PROCEDURE — 96372 THER/PROPH/DIAG INJ SC/IM: CPT | Mod: ,,, | Performed by: NURSE PRACTITIONER

## 2023-05-31 PROCEDURE — 84550 ASSAY OF BLOOD/URIC ACID: CPT | Mod: ,,, | Performed by: CLINICAL MEDICAL LABORATORY

## 2023-05-31 PROCEDURE — 86038 ANTINUCLEAR ANTIBODIES: CPT | Mod: ,,, | Performed by: CLINICAL MEDICAL LABORATORY

## 2023-05-31 PROCEDURE — 85025 COMPLETE CBC W/AUTO DIFF WBC: CPT | Mod: ,,, | Performed by: CLINICAL MEDICAL LABORATORY

## 2023-05-31 PROCEDURE — 96372 PR INJECTION,THERAP/PROPH/DIAG2ST, IM OR SUBCUT: ICD-10-PCS | Mod: ,,, | Performed by: NURSE PRACTITIONER

## 2023-05-31 PROCEDURE — 84550 URIC ACID: ICD-10-PCS | Mod: ,,, | Performed by: CLINICAL MEDICAL LABORATORY

## 2023-05-31 PROCEDURE — 99213 PR OFFICE/OUTPT VISIT, EST, LEVL III, 20-29 MIN: ICD-10-PCS | Mod: ,,, | Performed by: NURSE PRACTITIONER

## 2023-05-31 PROCEDURE — 80053 COMPREHEN METABOLIC PANEL: CPT | Mod: ,,, | Performed by: CLINICAL MEDICAL LABORATORY

## 2023-05-31 PROCEDURE — 86038 ANA EIA W/REFLEX DSDNA/ENA: ICD-10-PCS | Mod: ,,, | Performed by: CLINICAL MEDICAL LABORATORY

## 2023-05-31 PROCEDURE — 80053 COMPREHENSIVE METABOLIC PANEL: ICD-10-PCS | Mod: ,,, | Performed by: CLINICAL MEDICAL LABORATORY

## 2023-05-31 RX ORDER — METHYLPREDNISOLONE ACETATE 40 MG/ML
40 INJECTION, SUSPENSION INTRA-ARTICULAR; INTRALESIONAL; INTRAMUSCULAR; SOFT TISSUE
Status: COMPLETED | OUTPATIENT
Start: 2023-05-31 | End: 2023-05-31

## 2023-05-31 RX ORDER — DEXAMETHASONE SODIUM PHOSPHATE 4 MG/ML
4 INJECTION, SOLUTION INTRA-ARTICULAR; INTRALESIONAL; INTRAMUSCULAR; INTRAVENOUS; SOFT TISSUE
Status: COMPLETED | OUTPATIENT
Start: 2023-05-31 | End: 2023-05-31

## 2023-05-31 RX ORDER — PREDNISONE 50 MG/1
50 TABLET ORAL DAILY
Qty: 5 TABLET | Refills: 0 | Status: SHIPPED | OUTPATIENT
Start: 2023-05-31 | End: 2023-07-10

## 2023-05-31 RX ADMIN — DEXAMETHASONE SODIUM PHOSPHATE 4 MG: 4 INJECTION, SOLUTION INTRA-ARTICULAR; INTRALESIONAL; INTRAMUSCULAR; INTRAVENOUS; SOFT TISSUE at 08:05

## 2023-05-31 RX ADMIN — METHYLPREDNISOLONE ACETATE 40 MG: 40 INJECTION, SUSPENSION INTRA-ARTICULAR; INTRALESIONAL; INTRAMUSCULAR; SOFT TISSUE at 08:05

## 2023-06-01 LAB
ANA SER QL: NEGATIVE
CRP SERPL-MCNC: 3.25 MG/DL (ref 0–0.8)

## 2023-06-01 PROCEDURE — 86140 C-REACTIVE PROTEIN: ICD-10-PCS | Mod: ,,, | Performed by: CLINICAL MEDICAL LABORATORY

## 2023-06-01 PROCEDURE — 86140 C-REACTIVE PROTEIN: CPT | Mod: ,,, | Performed by: CLINICAL MEDICAL LABORATORY

## 2023-06-01 RX ORDER — PREDNISONE 10 MG/1
10 TABLET ORAL DAILY
Qty: 15 TABLET | Refills: 0 | Status: SHIPPED | OUTPATIENT
Start: 2023-06-10 | End: 2023-07-10 | Stop reason: SDUPTHER

## 2023-06-01 NOTE — ASSESSMENT & PLAN NOTE
Continue rochelle  Will get labs today   Consider referral depending on labs  Depomedrol, decadron IM   Rest    Follow up 3 mo

## 2023-06-01 NOTE — PROGRESS NOTES
KATHIE Finnegan   [facility]  [address] [address] [address]  [phone]      PATIENT NAME: Mack Moeller  : 1950  DATE: 23  MRN: [id number]      Billing Provider: KATHIE Finnegan  Level of Service:   Patient PCP Information       Provider PCP Type    KATHIE Finnegan General            Reason for Visit / Chief Complaint: Joint Pain (Pt has pain in knees bilaterally, shoulders, R wrist, and groin area./Pt has a hx of gout. /States it is hard for him to stand up and get out of bed. /States the pain is more of a soreness. Rates pain 9/10./Pt was seen on 5/10 for similar pain. )       Update PCP  Update Chief Complaint         History of Present Illness / Problem Focused Workflow     71 year old male presents with complaints of continuing to having muscle aches and joint pain   States pain in bilat knee, right wrist and groin area  Reports that changing to zetia for cholesterol has not helped over the last few weeks  States he was told many years ago that he had gout but was taken off of colchicine   Denies any injury, any fever      Hx of hypertension, hyperlipidemia, GERD, Parkinsons disease    Review of Systems     Review of Systems   Constitutional:  Negative for fatigue and fever.   HENT:  Negative for congestion, ear pain, rhinorrhea and sore throat.    Respiratory:  Negative for cough and shortness of breath.    Gastrointestinal:  Negative for abdominal pain, diarrhea and nausea.   Musculoskeletal:  Positive for arthralgias and myalgias. Negative for gait problem and joint swelling.   Allergic/Immunologic: Negative for environmental allergies.   Neurological:  Negative for dizziness, weakness and light-headedness.   Psychiatric/Behavioral:  Negative for agitation and dysphoric mood.      Medical / Social / Family History     Past Medical History:   Diagnosis Date    Cardiac arrhythmia 2020    Diverticular disease of colon     GERD  (gastroesophageal reflux disease)     Hyperlipidemia     Hypertension     Parkinson's disease 08/24/2020       Past Surgical History:   Procedure Laterality Date    CARDIAC DEFIBRILLATOR PLACEMENT      COLONOSCOPY W/ POLYPECTOMY  07/10/2018    HEMORRHOID SURGERY         Social History    reports that he has never smoked. He has never used smokeless tobacco. He reports that he does not drink alcohol and does not use drugs.    Family History  's family history includes Hypertension in his father.    Medications and Allergies     Medications  Outpatient Medications Marked as Taking for the 5/31/23 encounter (Office Visit) with KATHIE Finnegan   Medication Sig Dispense Refill    ascorbic acid, vitamin C, (VITAMIN C) 500 MG tablet Take 500 mg by mouth once daily.      aspirin (ECOTRIN) 81 MG EC tablet Take 81 mg by mouth once daily.      brimonidine 0.15 % OPTH DROP (ALPHAGAN) 0.15 % ophthalmic solution       cetirizine (ZYRTEC) 10 MG tablet Take 1 tablet (10 mg total) by mouth once daily. 90 tablet 1    cholecalciferol, vitamin D3, (VITAMIN D3) 50 mcg (2,000 unit) Cap Take 1 capsule by mouth once daily.      cyanocobalamin 2000 MCG tablet Take 2,000 mcg by mouth once daily.      ezetimibe (ZETIA) 10 mg tablet Take 1 tablet (10 mg total) by mouth once daily. 90 tablet 1    famotidine (PEPCID) 20 MG tablet Take 20 mg by mouth.      ketorolac 0.5% (ACULAR) 0.5 % Drop Place 1 drop into both eyes 4 (four) times daily as needed.      krill-om-3-dha-epa-phospho-ast (MEGARED OMEGA-3 KRILL OIL) 381-068-58-64 mg Cap Take 1 tablet by mouth once daily.      metoprolol succinate (TOPROL-XL) 25 MG 24 hr tablet 1/2 tab in the morning and 1 tab at bedtime 180 tablet 1    zinc gluconate 30 mg Tab Take 30 tablets by mouth once daily.      ZINC-15 66 mg Tab Take 1 tablet by mouth.         Allergies  Review of patient's allergies indicates:  No Known Allergies    Physical Examination     Vitals:    05/31/23 0822   BP: 136/82    Pulse: 86   Resp: 18   Temp: 96.9 °F (36.1 °C)     Physical Exam  Constitutional:       General: He is not in acute distress.  HENT:      Head: Normocephalic.      Nose: Nose normal.      Mouth/Throat:      Mouth: Mucous membranes are moist.   Eyes:      Extraocular Movements: Extraocular movements intact.   Cardiovascular:      Rate and Rhythm: Normal rate.   Pulmonary:      Effort: Pulmonary effort is normal. No respiratory distress.   Abdominal:      General: Bowel sounds are normal.      Palpations: Abdomen is soft.      Tenderness: There is no abdominal tenderness.   Musculoskeletal:         General: Tenderness present. No swelling or signs of injury. Normal range of motion.      Cervical back: Neck supple.   Skin:     General: Skin is warm.   Neurological:      Mental Status: He is alert and oriented to person, place, and time.   Psychiatric:         Behavior: Behavior normal.         Imaging / Labs     Office Visit on 05/31/2023   Component Date Value Ref Range Status    Uric Acid 05/31/2023 4.5  3.5 - 7.2 mg/dL Final    SALAZAR Screen 05/31/2023 Negative  Negative Final    Sodium 05/31/2023 140  136 - 145 mmol/L Final    Potassium 05/31/2023 4.6  3.5 - 5.1 mmol/L Final    Chloride 05/31/2023 107  98 - 107 mmol/L Final    CO2 05/31/2023 29  21 - 32 mmol/L Final    Anion Gap 05/31/2023 9  7 - 16 mmol/L Final    Glucose 05/31/2023 99  74 - 106 mg/dL Final    BUN 05/31/2023 11  7 - 18 mg/dL Final    Creatinine 05/31/2023 1.06  0.70 - 1.30 mg/dL Final    BUN/Creatinine Ratio 05/31/2023 10  6 - 20 Final    Calcium 05/31/2023 9.5  8.5 - 10.1 mg/dL Final    Total Protein 05/31/2023 7.2  6.4 - 8.2 g/dL Final    Albumin 05/31/2023 3.7  3.5 - 5.0 g/dL Final    Globulin 05/31/2023 3.5  2.0 - 4.0 g/dL Final    A/G Ratio 05/31/2023 1.1   Final    Bilirubin, Total 05/31/2023 0.8  >0.0 - 1.2 mg/dL Final    Alk Phos 05/31/2023 108  45 - 115 U/L Final    ALT 05/31/2023 27  16 - 61 U/L Final    AST 05/31/2023 24  15 - 37 U/L  Final    eGFR 05/31/2023 74  >=60 mL/min/1.73m2 Final    WBC 05/31/2023 8.98  4.50 - 11.00 K/uL Final    RBC 05/31/2023 5.19  4.60 - 6.20 M/uL Final    Hemoglobin 05/31/2023 15.3  13.5 - 18.0 g/dL Final    Hematocrit 05/31/2023 48.6  40.0 - 54.0 % Final    MCV 05/31/2023 93.6  80.0 - 96.0 fL Final    MCH 05/31/2023 29.5  27.0 - 31.0 pg Final    MCHC 05/31/2023 31.5 (L)  32.0 - 36.0 g/dL Final    RDW 05/31/2023 13.2  11.5 - 14.5 % Final    Platelet Count 05/31/2023 186  150 - 400 K/uL Final    MPV 05/31/2023 10.9  9.4 - 12.4 fL Final    Neutrophils % 05/31/2023 71.3 (H)  53.0 - 65.0 % Final    Lymphocytes % 05/31/2023 16.9 (L)  27.0 - 41.0 % Final    Monocytes % 05/31/2023 9.5 (H)  2.0 - 6.0 % Final    Eosinophils % 05/31/2023 1.8  1.0 - 4.0 % Final    Basophils % 05/31/2023 0.3  0.0 - 1.0 % Final    Immature Granulocytes % 05/31/2023 0.2  0.0 - 0.4 % Final    nRBC, Auto 05/31/2023 0.0  <=0.0 % Final    Neutrophils, Abs 05/31/2023 6.40  1.80 - 7.70 K/uL Final    Lymphocytes, Absolute 05/31/2023 1.52  1.00 - 4.80 K/uL Final    Monocytes, Absolute 05/31/2023 0.85 (H)  0.00 - 0.80 K/uL Final    Eosinophils, Absolute 05/31/2023 0.16  0.00 - 0.50 K/uL Final    Basophils, Absolute 05/31/2023 0.03  0.00 - 0.20 K/uL Final    Immature Granulocytes, Absolute 05/31/2023 0.02  0.00 - 0.04 K/uL Final    nRBC, Absolute 05/31/2023 0.00  <=0.00 x10e3/uL Final    Diff Type 05/31/2023 Auto   Final    CRP 06/01/2023 3.25 (H)  0.00 - 0.80 mg/dL Final     No image results found.      Assessment and Plan (including Health Maintenance)      Problem List  Smart Sets  Document Outside HM   :    Health Maintenance Due   Topic Date Due    Shingles Vaccine (1 of 2) Never done    COVID-19 Vaccine (4 - Pfizer series) 01/13/2022    Colorectal Cancer Screening  07/10/2023       Problem List Items Addressed This Visit          Orthopedic    Myalgia - Primary    Current Assessment & Plan     Continue zetia  Will get labs today   Consider referral  depending on labs  Depomedrol, decadron IM   Rest    Follow up 3 mo           Relevant Medications    predniSONE (DELTASONE) 50 MG Tab    predniSONE (DELTASONE) 10 MG tablet (Start on 6/10/2023)    Other Relevant Orders    Uric Acid (Completed)    SALAZAR EIA w/ Reflex to dsDNA/MERCEDES (Completed)    CBC Auto Differential (Completed)    Comprehensive Metabolic Panel (Completed)    C-Reactive Protein (Completed)    Ambulatory referral/consult to Rheumatology    Sedimentation Rate (Completed)    TSH (Completed)     Other Visit Diagnoses       Elevated C-reactive protein (CRP)        Relevant Orders    Ambulatory referral/consult to Rheumatology            Health Maintenance Topics with due status: Not Due       Topic Last Completion Date    TETANUS VACCINE 06/16/2022    PROSTATE-SPECIFIC ANTIGEN 12/01/2022    Lipid Panel 12/01/2022       Future Appointments   Date Time Provider Department Center   8/10/2023  8:40 [facility] [place]   11/20/2023  9:00 AM AWV NURSE, [facility] [place]          Signature:  KATHIE Finnegan  [facility]  [place] [facility] - FAMILY MEDICINE  [address] [place] [address] [place] [id number]  [phone]    Date of encounter: 5/31/23

## 2023-06-30 DIAGNOSIS — M10.9 GOUT, UNSPECIFIED CAUSE, UNSPECIFIED CHRONICITY, UNSPECIFIED SITE: ICD-10-CM

## 2023-07-03 RX ORDER — ALLOPURINOL 100 MG/1
TABLET ORAL
Qty: 180 TABLET | Refills: 0 | Status: SHIPPED | OUTPATIENT
Start: 2023-07-03 | End: 2023-08-17 | Stop reason: SDUPTHER

## 2023-07-10 ENCOUNTER — OFFICE VISIT (OUTPATIENT)
Dept: FAMILY MEDICINE | Facility: CLINIC | Age: 73
End: 2023-07-10
Payer: MEDICARE

## 2023-07-10 VITALS
RESPIRATION RATE: 18 BRPM | BODY MASS INDEX: 27.47 KG/M2 | SYSTOLIC BLOOD PRESSURE: 130 MMHG | TEMPERATURE: 97 F | WEIGHT: 202.81 LBS | HEIGHT: 72 IN | DIASTOLIC BLOOD PRESSURE: 82 MMHG | OXYGEN SATURATION: 94 % | HEART RATE: 72 BPM

## 2023-07-10 DIAGNOSIS — R79.82 ELEVATED C-REACTIVE PROTEIN (CRP): ICD-10-CM

## 2023-07-10 DIAGNOSIS — M19.90 ARTHRITIS PAIN: ICD-10-CM

## 2023-07-10 DIAGNOSIS — M79.10 MYALGIA: Primary | ICD-10-CM

## 2023-07-10 PROCEDURE — 96372 PR INJECTION,THERAP/PROPH/DIAG2ST, IM OR SUBCUT: ICD-10-PCS | Mod: ,,, | Performed by: NURSE PRACTITIONER

## 2023-07-10 PROCEDURE — 99213 PR OFFICE/OUTPT VISIT, EST, LEVL III, 20-29 MIN: ICD-10-PCS | Mod: ,,, | Performed by: NURSE PRACTITIONER

## 2023-07-10 PROCEDURE — 96372 THER/PROPH/DIAG INJ SC/IM: CPT | Mod: ,,, | Performed by: NURSE PRACTITIONER

## 2023-07-10 PROCEDURE — 99213 OFFICE O/P EST LOW 20 MIN: CPT | Mod: ,,, | Performed by: NURSE PRACTITIONER

## 2023-07-10 RX ORDER — METHYLPREDNISOLONE ACETATE 40 MG/ML
40 INJECTION, SUSPENSION INTRA-ARTICULAR; INTRALESIONAL; INTRAMUSCULAR; SOFT TISSUE
Status: COMPLETED | OUTPATIENT
Start: 2023-07-10 | End: 2023-07-10

## 2023-07-10 RX ORDER — DEXAMETHASONE SODIUM PHOSPHATE 4 MG/ML
4 INJECTION, SOLUTION INTRA-ARTICULAR; INTRALESIONAL; INTRAMUSCULAR; INTRAVENOUS; SOFT TISSUE
Status: COMPLETED | OUTPATIENT
Start: 2023-07-10 | End: 2023-07-10

## 2023-07-10 RX ORDER — PREDNISONE 10 MG/1
10 TABLET ORAL DAILY
Qty: 30 TABLET | Refills: 1 | Status: SHIPPED | OUTPATIENT
Start: 2023-07-10

## 2023-07-10 RX ADMIN — DEXAMETHASONE SODIUM PHOSPHATE 4 MG: 4 INJECTION, SOLUTION INTRA-ARTICULAR; INTRALESIONAL; INTRAMUSCULAR; INTRAVENOUS; SOFT TISSUE at 10:07

## 2023-07-10 RX ADMIN — METHYLPREDNISOLONE ACETATE 40 MG: 40 INJECTION, SUSPENSION INTRA-ARTICULAR; INTRALESIONAL; INTRAMUSCULAR; SOFT TISSUE at 10:07

## 2023-07-10 NOTE — ASSESSMENT & PLAN NOTE
Reports he had felt better for about 2-3 weeks and has started hurting in groin area and shoulders  He has not been contacted for rheumatology appt   Depomedrol decadron IM  Resend referral for rheumatology

## 2023-07-10 NOTE — PROGRESS NOTES
Reviewed Care Gaps with pt.   Health Maintenance Due   Topic Date Due    Shingles Vaccine (1 of 2) Never done    COVID-19 Vaccine (4 - Pfizer series) 01/13/2022    Colorectal Cancer Screening  07/10/2023     Pt refused completion of vaccinations at this time.   Pt would like to hold off on colonoscopy at this time.

## 2023-07-10 NOTE — PROGRESS NOTES
KATHIE Finnegan   [facility]  [address] [address] [address]  [phone]      PATIENT NAME: Mack Moeller  : 1950  DATE: 7/10/23  MRN: [id number]      Billing Provider: KATHIE Finnegan  Level of Service:   Patient PCP Information       Provider PCP Type    KATHIE Finnegan General            Reason for Visit / Chief Complaint: Pain (Pt states he is having pain in his shoulders, hands, and hips bilaterally. /Pt was seen on  for similar symptoms. /States steroid shot and oral steroid seemed to help for about 3 weeks. /States symptoms started back about 1 week ago. )       Update PCP  Update Chief Complaint         History of Present Illness / Problem Focused Workflow     72 year old male presents with complaints of continuing to having muscle aches and joint pain   Was treated for similar symptoms on   He was doing well for about 2-3 weeks and started having problems again  States he has not gotten referral for rheumatology     Hx of hypertension, hyperlipidemia, GERD, Parkinsons disease    Review of Systems     Review of Systems   Constitutional:  Negative for fatigue and fever.   HENT:  Negative for congestion, ear pain, rhinorrhea and sore throat.    Respiratory:  Negative for cough and shortness of breath.    Gastrointestinal:  Negative for abdominal pain, diarrhea and nausea.   Musculoskeletal:  Positive for arthralgias and myalgias. Negative for gait problem and joint swelling.   Allergic/Immunologic: Negative for environmental allergies.   Neurological:  Negative for dizziness, weakness and light-headedness.   Psychiatric/Behavioral:  Negative for agitation and dysphoric mood.      Medical / Social / Family History     Past Medical History:   Diagnosis Date    Cardiac arrhythmia 2020    Diverticular disease of colon     GERD (gastroesophageal reflux disease)     Hyperlipidemia     Hypertension     Parkinson's disease  08/24/2020       Past Surgical History:   Procedure Laterality Date    CARDIAC DEFIBRILLATOR PLACEMENT      COLONOSCOPY W/ POLYPECTOMY  07/10/2018    HEMORRHOID SURGERY         Social History    reports that he has never smoked. He has never used smokeless tobacco. He reports that he does not drink alcohol and does not use drugs.    Family History  's family history includes Hypertension in his father.    Medications and Allergies     Medications  Outpatient Medications Marked as Taking for the 7/10/23 encounter (Office Visit) with KATHIE Finnegan   Medication Sig Dispense Refill    allopurinoL (ZYLOPRIM) 100 MG tablet Take 1 tablet by mouth twice daily 180 tablet 0    ascorbic acid, vitamin C, (VITAMIN C) 500 MG tablet Take 500 mg by mouth once daily.      aspirin (ECOTRIN) 81 MG EC tablet Take 81 mg by mouth once daily.      brimonidine 0.15 % OPTH DROP (ALPHAGAN) 0.15 % ophthalmic solution       cholecalciferol, vitamin D3, (VITAMIN D3) 50 mcg (2,000 unit) Cap Take 1 capsule by mouth once daily.      cyanocobalamin 2000 MCG tablet Take 2,000 mcg by mouth once daily.      ezetimibe (ZETIA) 10 mg tablet Take 1 tablet (10 mg total) by mouth once daily. 90 tablet 1    famotidine (PEPCID) 20 MG tablet Take 20 mg by mouth.      ketorolac 0.5% (ACULAR) 0.5 % Drop Place 1 drop into both eyes 4 (four) times daily as needed.      krill-om-3-dha-epa-phospho-ast (MEGARED OMEGA-3 KRILL OIL) 852-833-14-64 mg Cap Take 1 tablet by mouth once daily.      metoprolol succinate (TOPROL-XL) 25 MG 24 hr tablet 1/2 tab in the morning and 1 tab at bedtime 180 tablet 1    zinc gluconate 30 mg Tab Take 30 tablets by mouth once daily.      ZINC-15 66 mg Tab Take 1 tablet by mouth.         Allergies  Review of patient's allergies indicates:  No Known Allergies    Physical Examination     Vitals:    07/10/23 0939   BP: 130/82   Pulse: 72   Resp: 18   Temp: 97.4 °F (36.3 °C)     Physical Exam  Constitutional:       General: He is not  in acute distress.  HENT:      Head: Normocephalic.      Nose: Nose normal.      Mouth/Throat:      Mouth: Mucous membranes are moist.   Eyes:      Extraocular Movements: Extraocular movements intact.   Cardiovascular:      Rate and Rhythm: Normal rate.   Pulmonary:      Effort: Pulmonary effort is normal. No respiratory distress.   Abdominal:      General: Bowel sounds are normal.      Palpations: Abdomen is soft.      Tenderness: There is no abdominal tenderness.   Musculoskeletal:         General: Tenderness present. No swelling or signs of injury. Normal range of motion.      Cervical back: Neck supple.   Skin:     General: Skin is warm.   Neurological:      Mental Status: He is alert and oriented to person, place, and time.   Psychiatric:         Behavior: Behavior normal.         Imaging / Labs     No visits with results within 1 Day(s) from this visit.   Latest known visit with results is:   Lab Visit on 06/02/2023   Component Date Value Ref Range Status    ESR Westergren 06/02/2023 19  0 - 20 mm/Hr Final    TSH 06/02/2023 1.410  0.358 - 3.740 uIU/mL Final     No image results found.      Assessment and Plan (including Health Maintenance)      Problem List  Smart Sets  Document Outside HM   :    Health Maintenance Due   Topic Date Due    Shingles Vaccine (1 of 2) Never done    COVID-19 Vaccine (4 - Pfizer series) 01/13/2022    Colorectal Cancer Screening  07/10/2023       Problem List Items Addressed This Visit          Orthopedic    Myalgia - Primary    Current Assessment & Plan     Reports he had felt better for about 2-3 weeks and has started hurting in groin area and shoulders  He has not been contacted for rheumatology appt   Depomedrol, decadron IM  Resend referral for rheumatology          Relevant Medications    predniSONE (DELTASONE) 10 MG tablet    Other Relevant Orders    Ambulatory referral/consult to Rheumatology    Arthritis pain    Relevant Medications    predniSONE (DELTASONE) 10 MG tablet     Other Relevant Orders    Ambulatory referral/consult to Rheumatology       Other    Elevated C-reactive protein (CRP)    Relevant Medications    predniSONE (DELTASONE) 10 MG tablet    Other Relevant Orders    Ambulatory referral/consult to Rheumatology       Health Maintenance Topics with due status: Not Due       Topic Last Completion Date    TETANUS VACCINE 06/16/2022    Influenza Vaccine 11/14/2022    PROSTATE-SPECIFIC ANTIGEN 12/01/2022    Lipid Panel 12/01/2022       Future Appointments   Date Time Provider Department Center   10/10/2023  9:40 AM KATHIE Finnegan [facility] [place]   11/20/2023  9:00 AM [facility]          Signature:  KATHIE Finnegan  [facility] - FAMILY MEDICINE  [address] [place] [id number]  [phone]    Date of encounter: 7/10/23

## 2023-08-10 DIAGNOSIS — M19.90 ARTHRITIS PAIN: ICD-10-CM

## 2023-08-10 DIAGNOSIS — E78.2 MIXED HYPERLIPIDEMIA: Primary | ICD-10-CM

## 2023-08-10 DIAGNOSIS — I10 PRIMARY HYPERTENSION: ICD-10-CM

## 2023-08-10 DIAGNOSIS — M79.10 MYALGIA: Primary | ICD-10-CM

## 2023-08-10 RX ORDER — METOPROLOL SUCCINATE 25 MG/1
TABLET, EXTENDED RELEASE ORAL
Qty: 180 TABLET | Refills: 1 | Status: SHIPPED | OUTPATIENT
Start: 2023-08-10 | End: 2023-08-17 | Stop reason: SDUPTHER

## 2023-08-11 ENCOUNTER — TELEPHONE (OUTPATIENT)
Dept: FAMILY MEDICINE | Facility: CLINIC | Age: 73
End: 2023-08-11
Payer: MEDICARE

## 2023-08-14 DIAGNOSIS — I10 PRIMARY HYPERTENSION: Primary | ICD-10-CM

## 2023-08-14 DIAGNOSIS — E78.2 MIXED HYPERLIPIDEMIA: Primary | ICD-10-CM

## 2023-08-14 RX ORDER — ATORVASTATIN CALCIUM 20 MG/1
20 TABLET, FILM COATED ORAL DAILY
Qty: 90 TABLET | Refills: 1 | Status: SHIPPED | OUTPATIENT
Start: 2023-08-14 | End: 2023-08-17 | Stop reason: SDUPTHER

## 2023-08-15 LAB — CRC RECOMMENDATION EXT: NORMAL

## 2023-08-17 DIAGNOSIS — E78.2 MIXED HYPERLIPIDEMIA: ICD-10-CM

## 2023-08-17 DIAGNOSIS — I10 PRIMARY HYPERTENSION: ICD-10-CM

## 2023-08-17 DIAGNOSIS — M10.9 GOUT, UNSPECIFIED CAUSE, UNSPECIFIED CHRONICITY, UNSPECIFIED SITE: ICD-10-CM

## 2023-08-17 RX ORDER — METOPROLOL SUCCINATE 25 MG/1
TABLET, EXTENDED RELEASE ORAL
Qty: 180 TABLET | Refills: 1 | Status: SHIPPED | OUTPATIENT
Start: 2023-08-17 | End: 2023-08-22

## 2023-08-17 RX ORDER — ATORVASTATIN CALCIUM 20 MG/1
20 TABLET, FILM COATED ORAL DAILY
Qty: 90 TABLET | Refills: 1 | Status: SHIPPED | OUTPATIENT
Start: 2023-08-17 | End: 2023-10-10 | Stop reason: SDUPTHER

## 2023-08-17 RX ORDER — ALLOPURINOL 100 MG/1
100 TABLET ORAL 2 TIMES DAILY
Qty: 180 TABLET | Refills: 1 | Status: SHIPPED | OUTPATIENT
Start: 2023-08-17 | End: 2023-10-10 | Stop reason: SDUPTHER

## 2023-08-18 ENCOUNTER — PATIENT OUTREACH (OUTPATIENT)
Dept: ADMINISTRATIVE | Facility: HOSPITAL | Age: 73
End: 2023-08-18

## 2023-08-22 DIAGNOSIS — I10 PRIMARY HYPERTENSION: ICD-10-CM

## 2023-08-22 RX ORDER — METOPROLOL SUCCINATE 25 MG/1
TABLET, EXTENDED RELEASE ORAL
Qty: 180 TABLET | Refills: 0 | Status: SHIPPED | OUTPATIENT
Start: 2023-08-22 | End: 2023-10-10 | Stop reason: SDUPTHER

## 2023-10-10 ENCOUNTER — OFFICE VISIT (OUTPATIENT)
Dept: FAMILY MEDICINE | Facility: CLINIC | Age: 73
End: 2023-10-10
Payer: MEDICARE

## 2023-10-10 VITALS
WEIGHT: 206 LBS | SYSTOLIC BLOOD PRESSURE: 122 MMHG | RESPIRATION RATE: 18 BRPM | HEIGHT: 72 IN | TEMPERATURE: 98 F | OXYGEN SATURATION: 98 % | DIASTOLIC BLOOD PRESSURE: 80 MMHG | BODY MASS INDEX: 27.9 KG/M2 | HEART RATE: 65 BPM

## 2023-10-10 DIAGNOSIS — M10.9 GOUT, UNSPECIFIED CAUSE, UNSPECIFIED CHRONICITY, UNSPECIFIED SITE: ICD-10-CM

## 2023-10-10 DIAGNOSIS — J30.9 ALLERGIC RHINITIS, UNSPECIFIED SEASONALITY, UNSPECIFIED TRIGGER: ICD-10-CM

## 2023-10-10 DIAGNOSIS — M05.79 SEROPOSITIVE RHEUMATOID ARTHRITIS OF MULTIPLE SITES: ICD-10-CM

## 2023-10-10 DIAGNOSIS — E78.2 MIXED HYPERLIPIDEMIA: ICD-10-CM

## 2023-10-10 DIAGNOSIS — I10 PRIMARY HYPERTENSION: Primary | ICD-10-CM

## 2023-10-10 PROBLEM — J32.9 SINUSITIS: Status: RESOLVED | Noted: 2023-01-09 | Resolved: 2023-10-10

## 2023-10-10 PROBLEM — R05.1 ACUTE COUGH: Status: RESOLVED | Noted: 2023-01-09 | Resolved: 2023-10-10

## 2023-10-10 PROBLEM — J22 UNSPECIFIED ACUTE LOWER RESPIRATORY INFECTION: Status: RESOLVED | Noted: 2022-06-29 | Resolved: 2023-10-10

## 2023-10-10 LAB
BASOPHILS # BLD AUTO: 0.04 K/UL (ref 0–0.2)
BASOPHILS NFR BLD AUTO: 0.5 % (ref 0–1)
CHOLEST SERPL-MCNC: 119 MG/DL (ref 0–200)
CHOLEST/HDLC SERPL: 3.3 {RATIO}
CRP SERPL-MCNC: 0.7 MG/DL (ref 0–0.8)
DIFFERENTIAL METHOD BLD: ABNORMAL
EOSINOPHIL # BLD AUTO: 0.15 K/UL (ref 0–0.5)
EOSINOPHIL NFR BLD AUTO: 2 % (ref 1–4)
ERYTHROCYTE [DISTWIDTH] IN BLOOD BY AUTOMATED COUNT: 13.7 % (ref 11.5–14.5)
ERYTHROCYTE [SEDIMENTATION RATE] IN BLOOD BY WESTERGREN METHOD: 14 MM/HR (ref 0–20)
HCT VFR BLD AUTO: 43.1 % (ref 40–54)
HDLC SERPL-MCNC: 36 MG/DL (ref 40–60)
HGB BLD-MCNC: 15.1 G/DL (ref 13.5–18)
IMM GRANULOCYTES # BLD AUTO: 0.03 K/UL (ref 0–0.04)
IMM GRANULOCYTES NFR BLD: 0.4 % (ref 0–0.4)
LDLC SERPL CALC-MCNC: 46 MG/DL
LDLC/HDLC SERPL: 1.3 {RATIO}
LYMPHOCYTES # BLD AUTO: 1.73 K/UL (ref 1–4.8)
LYMPHOCYTES NFR BLD AUTO: 23 % (ref 27–41)
MCH RBC QN AUTO: 31.3 PG (ref 27–31)
MCHC RBC AUTO-ENTMCNC: 35 G/DL (ref 32–36)
MCV RBC AUTO: 89.4 FL (ref 80–96)
MONOCYTES # BLD AUTO: 0.73 K/UL (ref 0–0.8)
MONOCYTES NFR BLD AUTO: 9.7 % (ref 2–6)
MPC BLD CALC-MCNC: 11.3 FL (ref 9.4–12.4)
NEUTROPHILS # BLD AUTO: 4.84 K/UL (ref 1.8–7.7)
NEUTROPHILS NFR BLD AUTO: 64.4 % (ref 53–65)
NONHDLC SERPL-MCNC: 83 MG/DL
NRBC # BLD AUTO: 0 X10E3/UL
NRBC, AUTO (.00): 0 %
PLATELET # BLD AUTO: 160 K/UL (ref 150–400)
RBC # BLD AUTO: 4.82 M/UL (ref 4.6–6.2)
TRIGL SERPL-MCNC: 185 MG/DL (ref 35–150)
VLDLC SERPL-MCNC: 37 MG/DL
WBC # BLD AUTO: 7.52 K/UL (ref 4.5–11)

## 2023-10-10 PROCEDURE — 80061 LIPID PANEL: CPT | Mod: ,,, | Performed by: CLINICAL MEDICAL LABORATORY

## 2023-10-10 PROCEDURE — 85025 COMPLETE CBC W/AUTO DIFF WBC: CPT | Mod: ,,, | Performed by: CLINICAL MEDICAL LABORATORY

## 2023-10-10 PROCEDURE — 85651 SEDIMENTATION RATE, AUTOMATED: ICD-10-PCS | Mod: ,,, | Performed by: CLINICAL MEDICAL LABORATORY

## 2023-10-10 PROCEDURE — 99214 OFFICE O/P EST MOD 30 MIN: CPT | Mod: ,,, | Performed by: NURSE PRACTITIONER

## 2023-10-10 PROCEDURE — 85025 CBC WITH DIFFERENTIAL: ICD-10-PCS | Mod: ,,, | Performed by: CLINICAL MEDICAL LABORATORY

## 2023-10-10 PROCEDURE — 86140 C-REACTIVE PROTEIN: ICD-10-PCS | Mod: ,,, | Performed by: CLINICAL MEDICAL LABORATORY

## 2023-10-10 PROCEDURE — 99214 PR OFFICE/OUTPT VISIT, EST, LEVL IV, 30-39 MIN: ICD-10-PCS | Mod: ,,, | Performed by: NURSE PRACTITIONER

## 2023-10-10 PROCEDURE — 86140 C-REACTIVE PROTEIN: CPT | Mod: ,,, | Performed by: CLINICAL MEDICAL LABORATORY

## 2023-10-10 PROCEDURE — 80061 LIPID PANEL: ICD-10-PCS | Mod: ,,, | Performed by: CLINICAL MEDICAL LABORATORY

## 2023-10-10 PROCEDURE — 85651 RBC SED RATE NONAUTOMATED: CPT | Mod: ,,, | Performed by: CLINICAL MEDICAL LABORATORY

## 2023-10-10 RX ORDER — METOPROLOL SUCCINATE 25 MG/1
TABLET, EXTENDED RELEASE ORAL
Qty: 180 TABLET | Refills: 1 | Status: SHIPPED | OUTPATIENT
Start: 2023-10-10 | End: 2024-02-14 | Stop reason: SDUPTHER

## 2023-10-10 RX ORDER — CETIRIZINE HYDROCHLORIDE 10 MG/1
10 TABLET ORAL DAILY
Qty: 90 TABLET | Refills: 1 | Status: SHIPPED | OUTPATIENT
Start: 2023-10-10 | End: 2024-04-07

## 2023-10-10 RX ORDER — METOPROLOL SUCCINATE 25 MG/1
25 TABLET, EXTENDED RELEASE ORAL 2 TIMES DAILY
Qty: 180 TABLET | Refills: 1 | Status: SHIPPED | OUTPATIENT
Start: 2023-10-10 | End: 2023-10-10 | Stop reason: SDUPTHER

## 2023-10-10 RX ORDER — METHOTREXATE 2.5 MG/1
TABLET ORAL
COMMUNITY
Start: 2023-09-21

## 2023-10-10 RX ORDER — ATORVASTATIN CALCIUM 20 MG/1
20 TABLET, FILM COATED ORAL DAILY
Qty: 90 TABLET | Refills: 1 | Status: SHIPPED | OUTPATIENT
Start: 2023-10-10 | End: 2024-02-14 | Stop reason: SDUPTHER

## 2023-10-10 RX ORDER — BRIMONIDINE TARTRATE 2 MG/ML
SOLUTION/ DROPS OPHTHALMIC
COMMUNITY
Start: 2023-08-10

## 2023-10-10 RX ORDER — FOLIC ACID 1 MG/1
1 TABLET ORAL DAILY
COMMUNITY

## 2023-10-10 RX ORDER — ALLOPURINOL 100 MG/1
100 TABLET ORAL 2 TIMES DAILY
Qty: 180 TABLET | Refills: 1 | Status: SHIPPED | OUTPATIENT
Start: 2023-10-10 | End: 2024-02-14 | Stop reason: SDUPTHER

## 2023-10-10 RX ORDER — FAMOTIDINE 20 MG/1
20 TABLET, FILM COATED ORAL DAILY
Qty: 90 TABLET | Refills: 1 | Status: SHIPPED | OUTPATIENT
Start: 2023-10-10

## 2023-10-10 RX ORDER — SUCRALFATE 1 G/1
1 TABLET ORAL 2 TIMES DAILY
COMMUNITY
Start: 2023-10-06

## 2023-10-10 NOTE — ASSESSMENT & PLAN NOTE
Will get labs today requested by the [facility]  States he will come by and  labs to follow up for next month  States he is feeling much better and no pain

## 2023-10-10 NOTE — PROGRESS NOTES
KATHIE Finnegan   [facility]  [address] [address] [address]  [phone]      PATIENT NAME: Mack Moeller  : 1950  DATE: 10/10/23  MRN: [id number]      Billing Provider: KATHIE Finnegan  Level of Service: [place] OFFICE/OUTPT VISIT, EST, LEVL IV, 30-39 MIN  Patient PCP Information       Provider PCP Type    KATHIE Finnegan General            Reason for Visit / Chief Complaint: Follow-up (Routine 3 month f/u./Is fasting/not due for routine labs yet.), Hypertension, and Hyperlipidemia       Update PCP  Update Chief Complaint         History of Present Illness / Problem Focused Workflow     73 year old male presents for 3 month follow up   States he is feeling much better since seeing rheumatology  Rheumatology has requested some labs   He is fasting today    Hx of hypertension, hyperlipidemia, GERD, Parkinsons disease    Review of Systems     Review of Systems   Constitutional:  Negative for fatigue and fever.   HENT:  Negative for congestion, ear pain, rhinorrhea and sore throat.    Respiratory:  Negative for cough and shortness of breath.    Gastrointestinal:  Negative for abdominal pain, diarrhea and nausea.   Musculoskeletal:  Positive for arthralgias and myalgias. Negative for gait problem and joint swelling.   Allergic/Immunologic: Negative for environmental allergies.   Neurological:  Negative for dizziness, weakness and light-headedness.   Psychiatric/Behavioral:  Negative for agitation and dysphoric mood.        Medical / Social / Family History     Past Medical History:   Diagnosis Date    Cardiac arrhythmia 2020    Diverticular disease of colon     GERD (gastroesophageal reflux disease)     Hyperlipidemia     Hypertension     Parkinson's disease 2020       Past Surgical History:   Procedure Laterality Date    CARDIAC DEFIBRILLATOR PLACEMENT      COLONOSCOPY W/ POLYPECTOMY  07/10/2018    HEMORRHOID SURGERY         Social  History    reports that he has never smoked. He has never used smokeless tobacco. He reports that he does not drink alcohol and does not use drugs.    Family History  MrErnesto's family history includes Hypertension in his father.    Medications and Allergies     Medications  Outpatient Medications Marked as Taking for the 10/10/23 encounter (Office Visit) with Shanta Cason FNP   Medication Sig Dispense Refill    ascorbic acid, vitamin C, (VITAMIN C) 500 MG tablet Take 500 mg by mouth once daily.      aspirin (ECOTRIN) 81 MG EC tablet Take 81 mg by mouth once daily.      brimonidine 0.2% (ALPHAGAN) 0.2 % Drop Place into both eyes.      cholecalciferol, vitamin D3, (VITAMIN D3) 50 mcg (2,000 unit) Cap Take 1 capsule by mouth once daily.      cyanocobalamin 2000 MCG tablet Take 2,000 mcg by mouth once daily.      folic acid (FOLVITE) 1 MG tablet Take 1 mg by mouth once daily.      ketorolac 0.5% (ACULAR) 0.5 % Drop Place 1 drop into both eyes 4 (four) times daily as needed.      krill-om-3-dha-epa-phospho-ast (MEGARED OMEGA-3 KRILL OIL) 588-452-17-64 mg Cap Take 1 tablet by mouth once daily.      methotrexate 2.5 MG Tab Take by mouth.      predniSONE (DELTASONE) 10 MG tablet Take 1 tablet (10 mg total) by mouth once daily. 30 tablet 1    sucralfate (CARAFATE) 1 gram tablet Take 1 g by mouth 2 (two) times daily.      zinc gluconate 30 mg Tab Take 30 tablets by mouth once daily.      [DISCONTINUED] allopurinoL (ZYLOPRIM) 100 MG tablet Take 1 tablet (100 mg total) by mouth 2 (two) times daily. 180 tablet 1    [DISCONTINUED] atorvastatin (LIPITOR) 20 MG tablet Take 1 tablet (20 mg total) by mouth once daily. 90 tablet 1    [DISCONTINUED] cetirizine (ZYRTEC) 10 MG tablet Take 1 tablet (10 mg total) by mouth once daily. 90 tablet 1    [DISCONTINUED] famotidine (PEPCID) 20 MG tablet Take 20 mg by mouth.      [DISCONTINUED] metoprolol succinate (TOPROL-XL) 25 MG 24 hr tablet TAKE 1/2 (ONE-HALF) TABLET BY MOUTH IN THE MORNING  AND 1 AT BEDTIME 180 tablet 0       Allergies  Review of patient's allergies indicates:  No Known Allergies    Physical Examination     Vitals:    10/10/23 0953   BP: 122/80   Pulse: 65   Resp: 18   Temp: 98.3 °F (36.8 °C)     Physical Exam  Constitutional:       General: He is not in acute distress.  HENT:      Head: Normocephalic.      Nose: Nose normal.      Mouth/Throat:      Mouth: Mucous membranes are moist.   Eyes:      Extraocular Movements: Extraocular movements intact.   Cardiovascular:      Rate and Rhythm: Normal rate.   Pulmonary:      Effort: Pulmonary effort is normal. No respiratory distress.   Abdominal:      General: Bowel sounds are normal.      Palpations: Abdomen is soft.      Tenderness: There is no abdominal tenderness.   Musculoskeletal:         General: Normal range of motion.      Cervical back: Neck supple.   Skin:     General: Skin is warm.   Neurological:      Mental Status: He is alert.   Psychiatric:         Behavior: Behavior normal.           Imaging / Labs     Office Visit on 10/10/2023   Component Date Value Ref Range Status    ESR Westergren 10/10/2023 14  0 - 20 mm/Hr Final    CRP 10/10/2023 0.70  0.00 - 0.80 mg/dL Final    Triglycerides 10/10/2023 185 (H)  35 - 150 mg/dL Final    Cholesterol 10/10/2023 119  0 - 200 mg/dL Final    HDL Cholesterol 10/10/2023 36 (L)  40 - 60 mg/dL Final    Cholesterol/HDL Ratio (Risk Factor) 10/10/2023 3.3   Final    Non-HDL 10/10/2023 83  mg/dL Final    LDL Calculated 10/10/2023 46  mg/dL Final    LDL/HDL 10/10/2023 1.3   Final    VLDL 10/10/2023 37  mg/dL Final    WBC 10/10/2023 7.52  4.50 - 11.00 K/uL Final    RBC 10/10/2023 4.82  4.60 - 6.20 M/uL Final    Hemoglobin 10/10/2023 15.1  13.5 - 18.0 g/dL Final    Hematocrit 10/10/2023 43.1  40.0 - 54.0 % Final    MCV 10/10/2023 89.4  80.0 - 96.0 fL Final    MCH 10/10/2023 31.3 (H)  27.0 - 31.0 pg Final    MCHC 10/10/2023 35.0  32.0 - 36.0 g/dL Final    RDW 10/10/2023 13.7  11.5 - 14.5 % Final     Platelet Count 10/10/2023 160  150 - 400 K/uL Final    MPV 10/10/2023 11.3  9.4 - 12.4 fL Final    Neutrophils % 10/10/2023 64.4  53.0 - 65.0 % Final    Lymphocytes % 10/10/2023 23.0 (L)  27.0 - 41.0 % Final    Monocytes % 10/10/2023 9.7 (H)  2.0 - 6.0 % Final    Eosinophils % 10/10/2023 2.0  1.0 - 4.0 % Final    Basophils % 10/10/2023 0.5  0.0 - 1.0 % Final    Immature Granulocytes % 10/10/2023 0.4  0.0 - 0.4 % Final    nRBC, Auto 10/10/2023 0.0  <=0.0 % Final    Neutrophils, Abs 10/10/2023 4.84  1.80 - 7.70 K/uL Final    Lymphocytes, Absolute 10/10/2023 1.73  1.00 - 4.80 K/uL Final    Monocytes, Absolute 10/10/2023 0.73  0.00 - 0.80 K/uL Final    Eosinophils, Absolute 10/10/2023 0.15  0.00 - 0.50 K/uL Final    Basophils, Absolute 10/10/2023 0.04  0.00 - 0.20 K/uL Final    Immature Granulocytes, Absolute 10/10/2023 0.03  0.00 - 0.04 K/uL Final    nRBC, Absolute 10/10/2023 0.00  <=0.00 x10e3/uL Final    Diff Type 10/10/2023 Auto   Final     No image results found.      Assessment and Plan (including Health Maintenance)      Problem List  Smart Sets  Document Outside HM   :    Health Maintenance Due   Topic Date Due    Shingles Vaccine (1 of 2) Never done    Influenza Vaccine (1) 09/01/2023    COVID-19 Vaccine (4 - 2023-24 season) 09/01/2023       Problem List Items Addressed This Visit          Cardiac/Vascular    Primary hypertension - Primary    Current Assessment & Plan     Condition is stable  Refill meds  Labs today; will treat as indicated          Relevant Medications    metoprolol succinate (TOPROL-XL) 25 MG 24 hr tablet    Other Relevant Orders    CBC Auto Differential (Completed)    Mixed hyperlipidemia    Current Assessment & Plan     Fasting labs today  Refill current meds          Relevant Medications    atorvastatin (LIPITOR) 20 MG tablet    Other Relevant Orders    CBC Auto Differential (Completed)    Lipid Panel (Completed)       Immunology/Multi System    Seropositive rheumatoid arthritis of  multiple sites    Current Assessment & Plan     Will get labs today requested by the arthritis center  States he will come by and  labs to follow up for next month  States he is feeling much better and no pain          Relevant Orders    Sedimentation Rate (Completed)    C-Reactive Protein (Completed)       Orthopedic    Gout    Relevant Medications    allopurinoL (ZYLOPRIM) 100 MG tablet     Other Visit Diagnoses       Allergic rhinitis, unspecified seasonality, unspecified trigger        Relevant Medications    cetirizine (ZYRTEC) 10 MG tablet            Health Maintenance Topics with due status: Not Due       Topic Last Completion Date    TETANUS VACCINE 06/16/2022    PROSTATE-SPECIFIC ANTIGEN 12/01/2022    Colorectal Cancer Screening 08/15/2023    Lipid Panel 10/10/2023       Future Appointments   Date Time Provider Department Center   11/20/2023  9:00 AM AW[facility] NURSEFABY [facility]   4/10/2024  9:20 AM Shanta Cason FNP [facility]          Signature:  KATHIE Finnegan [facility] - [facility]  [address] [place] [id number]  [phone]    Date of encounter: 10/10/23

## 2023-10-10 NOTE — PROGRESS NOTES
Health Maintenance Due   Topic Date Due    Shingles Vaccine (1 of 2) Never done    Influenza Vaccine (1) 09/01/2023    COVID-19 Vaccine (4 - 2023-24 season) 09/01/2023     Discussed care gaps.  Not interested in vaccs today.

## 2023-10-16 ENCOUNTER — OFFICE VISIT (OUTPATIENT)
Dept: FAMILY MEDICINE | Facility: CLINIC | Age: 73
End: 2023-10-16
Payer: MEDICARE

## 2023-10-16 VITALS
DIASTOLIC BLOOD PRESSURE: 80 MMHG | TEMPERATURE: 99 F | OXYGEN SATURATION: 95 % | SYSTOLIC BLOOD PRESSURE: 128 MMHG | WEIGHT: 205 LBS | BODY MASS INDEX: 27.77 KG/M2 | HEIGHT: 72 IN | RESPIRATION RATE: 18 BRPM | HEART RATE: 81 BPM

## 2023-10-16 DIAGNOSIS — R09.81 HEAD CONGESTION: ICD-10-CM

## 2023-10-16 DIAGNOSIS — J02.9 SORE THROAT: ICD-10-CM

## 2023-10-16 DIAGNOSIS — J06.9 UPPER RESPIRATORY TRACT INFECTION, UNSPECIFIED TYPE: Primary | ICD-10-CM

## 2023-10-16 DIAGNOSIS — R05.9 COUGH, UNSPECIFIED TYPE: ICD-10-CM

## 2023-10-16 LAB
CTP QC/QA: YES
S PYO RRNA THROAT QL PROBE: NEGATIVE

## 2023-10-16 PROCEDURE — 96372 THER/PROPH/DIAG INJ SC/IM: CPT | Mod: ,,, | Performed by: NURSE PRACTITIONER

## 2023-10-16 PROCEDURE — 96372 PR INJECTION,THERAP/PROPH/DIAG2ST, IM OR SUBCUT: ICD-10-PCS | Mod: ,,, | Performed by: NURSE PRACTITIONER

## 2023-10-16 PROCEDURE — 87880 STREP A ASSAY W/OPTIC: CPT | Mod: RHCUB | Performed by: NURSE PRACTITIONER

## 2023-10-16 PROCEDURE — 99213 PR OFFICE/OUTPT VISIT, EST, LEVL III, 20-29 MIN: ICD-10-PCS | Mod: ,,, | Performed by: NURSE PRACTITIONER

## 2023-10-16 PROCEDURE — 99213 OFFICE O/P EST LOW 20 MIN: CPT | Mod: ,,, | Performed by: NURSE PRACTITIONER

## 2023-10-16 RX ORDER — CEFTRIAXONE 1 G/1
1 INJECTION, POWDER, FOR SOLUTION INTRAMUSCULAR; INTRAVENOUS
Status: COMPLETED | OUTPATIENT
Start: 2023-10-16 | End: 2023-10-16

## 2023-10-16 RX ORDER — DEXAMETHASONE SODIUM PHOSPHATE 4 MG/ML
4 INJECTION, SOLUTION INTRA-ARTICULAR; INTRALESIONAL; INTRAMUSCULAR; INTRAVENOUS; SOFT TISSUE
Status: COMPLETED | OUTPATIENT
Start: 2023-10-16 | End: 2023-10-16

## 2023-10-16 RX ORDER — AMOXICILLIN 500 MG/1
500 CAPSULE ORAL EVERY 12 HOURS
Qty: 20 CAPSULE | Refills: 0 | Status: SHIPPED | OUTPATIENT
Start: 2023-10-16

## 2023-10-16 RX ADMIN — CEFTRIAXONE 1 G: 1 INJECTION, POWDER, FOR SOLUTION INTRAMUSCULAR; INTRAVENOUS at 10:10

## 2023-10-16 RX ADMIN — DEXAMETHASONE SODIUM PHOSPHATE 4 MG: 4 INJECTION, SOLUTION INTRA-ARTICULAR; INTRALESIONAL; INTRAMUSCULAR; INTRAVENOUS; SOFT TISSUE at 10:10

## 2023-10-16 NOTE — PROGRESS NOTES
Health Maintenance Due   Topic Date Due    Shingles Vaccine (1 of 2) Never done    Influenza Vaccine (1) 09/01/2023    COVID-19 Vaccine (4 - 2023-24 season) 09/01/2023     Discussed care gaps.  Not interested in vaccs.

## 2023-10-16 NOTE — PROGRESS NOTES
KATHIE Finnegan   [facility]  [address] [address] [address]  [phone]      PATIENT NAME: Mack Moeller  : 1950  DATE: 10/16/23  MRN: [id number]      Billing Provider: KATHIE Finnegan  Level of Service: [place] OFFICE/OUTPT VISIT, EST, LEVL III, 20-29 MIN  Patient PCP Information       Provider PCP Type    KATHIE Finnegan General            Reason for Visit / Chief Complaint: Sore Throat (Started last night./Wife has strep throat right now.)       Update PCP  Update Chief Complaint         History of Present Illness / Problem Focused Workflow     73 year old male presents with complaints of cough, congestion, sore throat  Wife recently diagnosed with strep  Reports low grade fever last night       Hx of hypertension, hyperlipidemia, GERD, Parkinsons disease    Review of Systems     Review of Systems   Constitutional:  Negative for fatigue and fever.   HENT:  Positive for congestion and sore throat. Negative for ear pain, rhinorrhea and sinus pressure.    Respiratory:  Positive for cough. Negative for shortness of breath.    Gastrointestinal:  Negative for abdominal pain, diarrhea and nausea.   Musculoskeletal:  Positive for arthralgias and myalgias. Negative for gait problem and joint swelling.   Allergic/Immunologic: Negative for environmental allergies.   Neurological:  Negative for dizziness, weakness and light-headedness.   Psychiatric/Behavioral:  Negative for agitation and dysphoric mood.        Medical / Social / Family History     Past Medical History:   Diagnosis Date    Cardiac arrhythmia 2020    Diverticular disease of colon     GERD (gastroesophageal reflux disease)     Hyperlipidemia     Hypertension     Parkinson's disease 2020       Past Surgical History:   Procedure Laterality Date    CARDIAC DEFIBRILLATOR PLACEMENT      COLONOSCOPY W/ POLYPECTOMY  07/10/2018    HEMORRHOID SURGERY         Social History  Mr. pagan  that he has never smoked. He has never used smokeless tobacco. He reports that he does not drink alcohol and does not use drugs.    Family History  Mr.'s family history includes Hypertension in his father.    Medications and Allergies     Medications  Outpatient Medications Marked as Taking for the 10/16/23 encounter (Office Visit) with Shanta Cason FNP   Medication Sig Dispense Refill    allopurinoL (ZYLOPRIM) 100 MG tablet Take 1 tablet (100 mg total) by mouth 2 (two) times daily. 180 tablet 1    ascorbic acid, vitamin C, (VITAMIN C) 500 MG tablet Take 500 mg by mouth once daily.      aspirin (ECOTRIN) 81 MG EC tablet Take 81 mg by mouth once daily.      atorvastatin (LIPITOR) 20 MG tablet Take 1 tablet (20 mg total) by mouth once daily. 90 tablet 1    brimonidine 0.2% (ALPHAGAN) 0.2 % Drop Place into both eyes.      cetirizine (ZYRTEC) 10 MG tablet Take 1 tablet (10 mg total) by mouth once daily. 90 tablet 1    cholecalciferol, vitamin D3, (VITAMIN D3) 50 mcg (2,000 unit) Cap Take 1 capsule by mouth once daily.      cyanocobalamin 2000 MCG tablet Take 2,000 mcg by mouth once daily.      famotidine (PEPCID) 20 MG tablet Take 1 tablet (20 mg total) by mouth Daily. 90 tablet 1    folic acid (FOLVITE) 1 MG tablet Take 1 mg by mouth once daily.      ketorolac 0.5% (ACULAR) 0.5 % Drop Place 1 drop into both eyes 4 (four) times daily as needed.      krill-om-3-dha-epa-phospho-ast (MEGARED OMEGA-3 KRILL OIL) 408-911-63-64 mg Cap Take 1 tablet by mouth once daily.      methotrexate 2.5 MG Tab Take by mouth.      metoprolol succinate (TOPROL-XL) 25 MG 24 hr tablet TAKE 1/2 (ONE-HALF) TABLET BY MOUTH IN THE MORNING AND 1 AT BEDTIME 180 tablet 1    predniSONE (DELTASONE) 10 MG tablet Take 1 tablet (10 mg total) by mouth once daily. 30 tablet 1    sucralfate (CARAFATE) 1 gram tablet Take 1 g by mouth 2 (two) times daily.      zinc gluconate 30 mg Tab Take 30 tablets by mouth once daily.      ZINC-15 66 mg Tab Take 1 tablet  by mouth.         Allergies  Review of patient's allergies indicates:  No Known Allergies    Physical Examination     Vitals:    10/16/23 0957   BP: 128/80   Pulse: 81   Resp: 18   Temp: 99.1 °F (37.3 °C)     Physical Exam  Constitutional:       General: He is not in acute distress.  HENT:      Head: Normocephalic.      Ears:      Comments: Bilat TM dull     Nose: Congestion present.      Mouth/Throat:      Mouth: Mucous membranes are moist.      Pharynx: Posterior oropharyngeal erythema present. No oropharyngeal exudate.   Eyes:      Extraocular Movements: Extraocular movements intact.   Cardiovascular:      Rate and Rhythm: Normal rate.   Pulmonary:      Effort: Pulmonary effort is normal. No respiratory distress.   Abdominal:      General: Bowel sounds are normal.      Palpations: Abdomen is soft.      Tenderness: There is no abdominal tenderness.   Musculoskeletal:         General: Normal range of motion.      Cervical back: Neck supple.   Skin:     General: Skin is warm.   Neurological:      Mental Status: He is alert.   Psychiatric:         Behavior: Behavior normal.           Imaging / Labs     Office Visit on 10/16/2023   Component Date Value Ref Range Status    Rapid Strep A Screen 10/16/2023 Negative  Negative Final     Acceptable 10/16/2023 Yes   Final     No image results found.      Assessment and Plan (including Health Maintenance)      Problem List  Smart Sets  Document Outside HM   :    Health Maintenance Due   Topic Date Due    Shingles Vaccine (1 of 2) Never done    RSV Vaccine (Age 60+) (1 - 1-dose 60+ series) Never done    Influenza Vaccine (1) 09/01/2023    COVID-19 Vaccine (4 - 2023-24 season) 09/01/2023       Problem List Items Addressed This Visit          ENT    Upper respiratory tract infection - Primary    Current Assessment & Plan     Sore throat, cough ,congestion since yesterday  Strep swab negative today  Will treat for URI  Rocephin, decadron IM  Amoxicillin po  BID  Rest. Increase fluids as tolerated          Relevant Medications    amoxicillin (AMOXIL) 500 MG capsule     Other Visit Diagnoses       Sore throat        Relevant Orders    POCT rapid strep A (Completed)    Head congestion        Relevant Medications    dexAMETHasone injection 4 mg (Completed)    amoxicillin (AMOXIL) 500 MG capsule    Cough, unspecified type                Health Maintenance Topics with due status: Not Due       Topic Last Completion Date    TETANUS VACCINE 06/16/2022    PROSTATE-SPECIFIC ANTIGEN 12/01/2022    Colorectal Cancer Screening 08/15/2023    Lipid Panel 10/10/2023       Future Appointments   Date Time Provider Department Center   11/20/2023  9:00 AM [facility]   4/10/2024  9:20 AM Shanta Cason FNP [facility]          Signature:  KATHIE Finnegan  [facility]  [address] [place] [id number]  [phone]    Date of encounter: 10/16/23

## 2023-10-16 NOTE — ASSESSMENT & PLAN NOTE
Sore throat, cough ,congestion since yesterday  Strep swab negative today  Will treat for URI  Rocephin, decadron IM  Amoxicillin po BID  Rest. Increase fluids as tolerated

## 2023-10-18 DIAGNOSIS — U07.1 COVID: Primary | ICD-10-CM

## 2023-12-09 DIAGNOSIS — Z71.89 COMPLEX CARE COORDINATION: ICD-10-CM

## 2024-02-14 DIAGNOSIS — I10 PRIMARY HYPERTENSION: ICD-10-CM

## 2024-02-14 DIAGNOSIS — M10.9 GOUT, UNSPECIFIED CAUSE, UNSPECIFIED CHRONICITY, UNSPECIFIED SITE: ICD-10-CM

## 2024-02-14 DIAGNOSIS — E78.2 MIXED HYPERLIPIDEMIA: ICD-10-CM

## 2024-02-14 RX ORDER — METOPROLOL SUCCINATE 25 MG/1
TABLET, EXTENDED RELEASE ORAL
Qty: 180 TABLET | Refills: 1 | Status: SHIPPED | OUTPATIENT
Start: 2024-02-14 | End: 2024-04-11 | Stop reason: SDUPTHER

## 2024-02-14 RX ORDER — ALLOPURINOL 100 MG/1
100 TABLET ORAL 2 TIMES DAILY
Qty: 180 TABLET | Refills: 1 | Status: SHIPPED | OUTPATIENT
Start: 2024-02-14 | End: 2024-04-11 | Stop reason: SDUPTHER

## 2024-02-14 RX ORDER — ATORVASTATIN CALCIUM 20 MG/1
20 TABLET, FILM COATED ORAL DAILY
Qty: 90 TABLET | Refills: 1 | Status: SHIPPED | OUTPATIENT
Start: 2024-02-14 | End: 2024-04-11 | Stop reason: SDUPTHER

## 2024-03-06 DIAGNOSIS — M05.79 SEROPOSITIVE RHEUMATOID ARTHRITIS OF MULTIPLE SITES: Primary | ICD-10-CM

## 2024-04-11 ENCOUNTER — OFFICE VISIT (OUTPATIENT)
Dept: FAMILY MEDICINE | Facility: CLINIC | Age: 74
End: 2024-04-11
Payer: MEDICARE

## 2024-04-11 VITALS
DIASTOLIC BLOOD PRESSURE: 80 MMHG | TEMPERATURE: 98 F | SYSTOLIC BLOOD PRESSURE: 122 MMHG | HEIGHT: 72 IN | HEART RATE: 86 BPM | WEIGHT: 207.19 LBS | OXYGEN SATURATION: 98 % | BODY MASS INDEX: 28.06 KG/M2 | RESPIRATION RATE: 18 BRPM

## 2024-04-11 DIAGNOSIS — Z12.5 ENCOUNTER FOR SCREENING FOR MALIGNANT NEOPLASM OF PROSTATE: ICD-10-CM

## 2024-04-11 DIAGNOSIS — M06.9 RHEUMATOID ARTHRITIS OF OTHER SITE, UNSPECIFIED WHETHER RHEUMATOID FACTOR PRESENT: ICD-10-CM

## 2024-04-11 DIAGNOSIS — M79.18 MYALGIA, OTHER SITE: ICD-10-CM

## 2024-04-11 DIAGNOSIS — J30.9 ALLERGIC RHINITIS, UNSPECIFIED SEASONALITY, UNSPECIFIED TRIGGER: ICD-10-CM

## 2024-04-11 DIAGNOSIS — D84.821 DRUG-INDUCED IMMUNODEFICIENCY: ICD-10-CM

## 2024-04-11 DIAGNOSIS — K21.9 GASTROESOPHAGEAL REFLUX DISEASE WITHOUT ESOPHAGITIS: ICD-10-CM

## 2024-04-11 DIAGNOSIS — E78.2 MIXED HYPERLIPIDEMIA: ICD-10-CM

## 2024-04-11 DIAGNOSIS — Z86.39 HISTORY OF OBESITY: ICD-10-CM

## 2024-04-11 DIAGNOSIS — Z79.899 DRUG-INDUCED IMMUNODEFICIENCY: ICD-10-CM

## 2024-04-11 DIAGNOSIS — Z00.00 ENCOUNTER FOR INITIAL ANNUAL WELLNESS VISIT (AWV) IN MEDICARE PATIENT: ICD-10-CM

## 2024-04-11 DIAGNOSIS — I10 PRIMARY HYPERTENSION: Primary | ICD-10-CM

## 2024-04-11 DIAGNOSIS — M10.9 GOUT, UNSPECIFIED CAUSE, UNSPECIFIED CHRONICITY, UNSPECIFIED SITE: ICD-10-CM

## 2024-04-11 PROBLEM — J06.9 UPPER RESPIRATORY TRACT INFECTION: Status: RESOLVED | Noted: 2023-10-16 | Resolved: 2024-04-11

## 2024-04-11 LAB
25(OH)D3 SERPL-MCNC: 54.3 NG/ML
ALBUMIN SERPL BCP-MCNC: 3.7 G/DL (ref 3.5–5)
ALBUMIN/GLOB SERPL: 1.1 {RATIO}
ALP SERPL-CCNC: 105 U/L (ref 45–115)
ALT SERPL W P-5'-P-CCNC: 33 U/L (ref 16–61)
ANION GAP SERPL CALCULATED.3IONS-SCNC: 11 MMOL/L (ref 7–16)
AST SERPL W P-5'-P-CCNC: 25 U/L (ref 15–37)
BASOPHILS # BLD AUTO: 0.03 K/UL (ref 0–0.2)
BASOPHILS NFR BLD AUTO: 0.4 % (ref 0–1)
BILIRUB SERPL-MCNC: 0.9 MG/DL (ref ?–1.2)
BUN SERPL-MCNC: 15 MG/DL (ref 7–18)
BUN/CREAT SERPL: 15 (ref 6–20)
CALCIUM SERPL-MCNC: 9.6 MG/DL (ref 8.5–10.1)
CHLORIDE SERPL-SCNC: 110 MMOL/L (ref 98–107)
CHOLEST SERPL-MCNC: 143 MG/DL (ref 0–200)
CHOLEST/HDLC SERPL: 4.1 {RATIO}
CO2 SERPL-SCNC: 22 MMOL/L (ref 21–32)
CREAT SERPL-MCNC: 0.97 MG/DL (ref 0.7–1.3)
DIFFERENTIAL METHOD BLD: ABNORMAL
EGFR (NO RACE VARIABLE) (RUSH/TITUS): 81 ML/MIN/1.73M2
EOSINOPHIL # BLD AUTO: 0.18 K/UL (ref 0–0.5)
EOSINOPHIL NFR BLD AUTO: 2.6 % (ref 1–4)
ERYTHROCYTE [DISTWIDTH] IN BLOOD BY AUTOMATED COUNT: 14 % (ref 11.5–14.5)
FOLATE SERPL-MCNC: >20 NG/ML (ref 3.1–17.5)
GLOBULIN SER-MCNC: 3.3 G/DL (ref 2–4)
GLUCOSE SERPL-MCNC: 102 MG/DL (ref 74–106)
HCT VFR BLD AUTO: 47 % (ref 40–54)
HDLC SERPL-MCNC: 35 MG/DL (ref 40–60)
HGB BLD-MCNC: 15.7 G/DL (ref 13.5–18)
IMM GRANULOCYTES # BLD AUTO: 0.02 K/UL (ref 0–0.04)
IMM GRANULOCYTES NFR BLD: 0.3 % (ref 0–0.4)
LDLC SERPL CALC-MCNC: 73 MG/DL
LDLC/HDLC SERPL: 2.1 {RATIO}
LYMPHOCYTES # BLD AUTO: 1.66 K/UL (ref 1–4.8)
LYMPHOCYTES NFR BLD AUTO: 24.2 % (ref 27–41)
MCH RBC QN AUTO: 31.3 PG (ref 27–31)
MCHC RBC AUTO-ENTMCNC: 33.4 G/DL (ref 32–36)
MCV RBC AUTO: 93.6 FL (ref 80–96)
MONOCYTES # BLD AUTO: 0.68 K/UL (ref 0–0.8)
MONOCYTES NFR BLD AUTO: 9.9 % (ref 2–6)
MPC BLD CALC-MCNC: 12.1 FL (ref 9.4–12.4)
NEUTROPHILS # BLD AUTO: 4.29 K/UL (ref 1.8–7.7)
NEUTROPHILS NFR BLD AUTO: 62.6 % (ref 53–65)
NONHDLC SERPL-MCNC: 108 MG/DL
NRBC # BLD AUTO: 0 X10E3/UL
NRBC, AUTO (.00): 0 %
PLATELET # BLD AUTO: 148 K/UL (ref 150–400)
POTASSIUM SERPL-SCNC: 4.4 MMOL/L (ref 3.5–5.1)
PROT SERPL-MCNC: 7 G/DL (ref 6.4–8.2)
PSA SERPL-MCNC: 1.05 NG/ML
RBC # BLD AUTO: 5.02 M/UL (ref 4.6–6.2)
SODIUM SERPL-SCNC: 139 MMOL/L (ref 136–145)
TRIGL SERPL-MCNC: 173 MG/DL (ref 35–150)
TSH SERPL DL<=0.005 MIU/L-ACNC: 3.42 UIU/ML (ref 0.36–3.74)
URATE SERPL-MCNC: 4.7 MG/DL (ref 3.5–7.2)
VIT B12 SERPL-MCNC: 861 PG/ML (ref 193–986)
VLDLC SERPL-MCNC: 35 MG/DL
WBC # BLD AUTO: 6.86 K/UL (ref 4.5–11)

## 2024-04-11 PROCEDURE — 84443 ASSAY THYROID STIM HORMONE: CPT | Mod: ,,, | Performed by: CLINICAL MEDICAL LABORATORY

## 2024-04-11 PROCEDURE — 80053 COMPREHEN METABOLIC PANEL: CPT | Mod: ,,, | Performed by: CLINICAL MEDICAL LABORATORY

## 2024-04-11 PROCEDURE — 84550 ASSAY OF BLOOD/URIC ACID: CPT | Mod: ,,, | Performed by: CLINICAL MEDICAL LABORATORY

## 2024-04-11 PROCEDURE — 99214 OFFICE O/P EST MOD 30 MIN: CPT | Mod: ,,, | Performed by: NURSE PRACTITIONER

## 2024-04-11 PROCEDURE — G0103 PSA SCREENING: HCPCS | Mod: ,,, | Performed by: CLINICAL MEDICAL LABORATORY

## 2024-04-11 PROCEDURE — 82306 VITAMIN D 25 HYDROXY: CPT | Mod: ,,, | Performed by: CLINICAL MEDICAL LABORATORY

## 2024-04-11 PROCEDURE — 80061 LIPID PANEL: CPT | Mod: ,,, | Performed by: CLINICAL MEDICAL LABORATORY

## 2024-04-11 PROCEDURE — 82607 VITAMIN B-12: CPT | Mod: ,,, | Performed by: CLINICAL MEDICAL LABORATORY

## 2024-04-11 PROCEDURE — 85025 COMPLETE CBC W/AUTO DIFF WBC: CPT | Mod: ,,, | Performed by: CLINICAL MEDICAL LABORATORY

## 2024-04-11 PROCEDURE — 82746 ASSAY OF FOLIC ACID SERUM: CPT | Mod: ,,, | Performed by: CLINICAL MEDICAL LABORATORY

## 2024-04-11 RX ORDER — ALLOPURINOL 100 MG/1
100 TABLET ORAL 2 TIMES DAILY
Qty: 180 TABLET | Refills: 1 | Status: SHIPPED | OUTPATIENT
Start: 2024-04-11

## 2024-04-11 RX ORDER — ATORVASTATIN CALCIUM 20 MG/1
20 TABLET, FILM COATED ORAL DAILY
Qty: 90 TABLET | Refills: 1 | Status: SHIPPED | OUTPATIENT
Start: 2024-04-11 | End: 2024-04-11

## 2024-04-11 RX ORDER — METOPROLOL SUCCINATE 25 MG/1
TABLET, EXTENDED RELEASE ORAL
Qty: 180 TABLET | Refills: 1 | Status: CANCELLED | OUTPATIENT
Start: 2024-04-11

## 2024-04-11 RX ORDER — ALLOPURINOL 100 MG/1
100 TABLET ORAL 2 TIMES DAILY
Qty: 180 TABLET | Refills: 1 | Status: CANCELLED | OUTPATIENT
Start: 2024-04-11

## 2024-04-11 RX ORDER — METOPROLOL SUCCINATE 25 MG/1
TABLET, EXTENDED RELEASE ORAL
Qty: 180 TABLET | Refills: 1 | Status: SHIPPED | OUTPATIENT
Start: 2024-04-11

## 2024-04-11 RX ORDER — FAMOTIDINE 20 MG/1
20 TABLET, FILM COATED ORAL DAILY
Qty: 90 TABLET | Refills: 1 | Status: SHIPPED | OUTPATIENT
Start: 2024-04-11 | End: 2024-04-11

## 2024-04-11 RX ORDER — ATORVASTATIN CALCIUM 20 MG/1
20 TABLET, FILM COATED ORAL DAILY
Qty: 90 TABLET | Refills: 1 | Status: SHIPPED | OUTPATIENT
Start: 2024-04-11 | End: 2024-04-15

## 2024-04-11 RX ORDER — CETIRIZINE HYDROCHLORIDE 10 MG/1
10 TABLET ORAL DAILY
Qty: 90 TABLET | Refills: 1 | Status: CANCELLED | OUTPATIENT
Start: 2024-04-11 | End: 2024-10-08

## 2024-04-11 RX ORDER — FAMOTIDINE 40 MG/1
40 TABLET, FILM COATED ORAL DAILY
Qty: 90 TABLET | Refills: 1 | Status: CANCELLED | OUTPATIENT
Start: 2024-04-11 | End: 2025-04-11

## 2024-04-11 RX ORDER — ATORVASTATIN CALCIUM 20 MG/1
20 TABLET, FILM COATED ORAL DAILY
Qty: 90 TABLET | Refills: 1 | Status: CANCELLED | OUTPATIENT
Start: 2024-04-11 | End: 2025-04-11

## 2024-04-11 RX ORDER — CETIRIZINE HYDROCHLORIDE 10 MG/1
10 TABLET ORAL DAILY
Qty: 90 TABLET | Refills: 1 | Status: SHIPPED | OUTPATIENT
Start: 2024-04-11 | End: 2024-04-11

## 2024-04-11 RX ORDER — ALLOPURINOL 100 MG/1
100 TABLET ORAL 2 TIMES DAILY
Qty: 180 TABLET | Refills: 1 | Status: SHIPPED | OUTPATIENT
Start: 2024-04-11 | End: 2024-04-11

## 2024-04-11 RX ORDER — CETIRIZINE HYDROCHLORIDE 10 MG/1
10 TABLET ORAL DAILY
Qty: 90 TABLET | Refills: 1 | Status: SHIPPED | OUTPATIENT
Start: 2024-04-11 | End: 2024-10-08

## 2024-04-11 RX ORDER — FAMOTIDINE 20 MG/1
20 TABLET, FILM COATED ORAL DAILY
Qty: 90 TABLET | Refills: 1 | Status: SHIPPED | OUTPATIENT
Start: 2024-04-11

## 2024-04-11 RX ORDER — METOPROLOL SUCCINATE 25 MG/1
TABLET, EXTENDED RELEASE ORAL
Qty: 180 TABLET | Refills: 1 | Status: SHIPPED | OUTPATIENT
Start: 2024-04-11 | End: 2024-04-11

## 2024-04-11 NOTE — ASSESSMENT & PLAN NOTE
Lab Results   Component Value Date    TSH 1.410 06/02/2023     Will collect fasting lipid today  Continue current plan

## 2024-04-11 NOTE — ASSESSMENT & PLAN NOTE
Currently being treated for RA with methotrexate  He is followed by Dr Waters in [place]   Reports he is doing well

## 2024-04-11 NOTE — PROGRESS NOTES
KATHIE Finnegan   [facility]  [address] [address]  [phone]      PATIENT NAME: Mack Moeller  : 1950  DATE: 24  MRN: [id number]      Billing Provider: KATHIE Finnegan  Level of Service: NE OFFICE/OUTPT VISIT, EST, LEVL IV, 30-39 MIN  Patient PCP Information       Provider PCP Type    KATHIE Finnegan General            Reason for Visit / Chief Complaint: Follow-up (Six month follow up/Pt is fasting ) and Medication Refill (Requesting medication refills )       Update PCP  Update Chief Complaint         History of Present Illness / Problem Focused Workflow     73 year old male presents for follow up   Needing medication refills  He is fasting for labs  Reports he recently had cataract surgery and his doing well with it  Also states he has had some bilat hand pain but does well when he wears his brace      Review of Systems     Review of Systems   Constitutional:  Negative for fatigue and fever.   HENT:  Negative for congestion.    Eyes:  Positive for visual disturbance.   Respiratory:  Negative for cough and shortness of breath.    Cardiovascular:  Negative for chest pain.   Gastrointestinal:  Negative for abdominal pain, constipation, diarrhea and vomiting.   Endocrine: Negative for polydipsia and polyuria.   Musculoskeletal:  Positive for arthralgias and myalgias. Negative for gait problem.   Allergic/Immunologic: Positive for environmental allergies.   Neurological:  Negative for dizziness, weakness and headaches.   Psychiatric/Behavioral:  Negative for dysphoric mood. The patient is not nervous/anxious.        Medical / Social / Family History     Past Medical History:   Diagnosis Date    Cardiac arrhythmia 2020    Diverticular disease of colon     GERD (gastroesophageal reflux disease)     Hyperlipidemia     Hypertension     Parkinson's disease 2020       Past Surgical History:   Procedure Laterality  Date    CARDIAC DEFIBRILLATOR PLACEMENT      COLONOSCOPY W/ POLYPECTOMY  07/10/2018    HEMORRHOID SURGERY         Social History    reports that he has never smoked. He has never used smokeless tobacco. He reports that he does not drink alcohol and does not use drugs.    Family History  's family history includes Hypertension in his father.    Medications and Allergies     Medications  Current Outpatient Medications   Medication Sig Dispense Refill    ascorbic acid, vitamin C, (VITAMIN C) 500 MG tablet Take 500 mg by mouth once daily.      aspirin (ECOTRIN) 81 MG EC tablet Take 81 mg by mouth once daily.      brimonidine 0.2% (ALPHAGAN) 0.2 % Drop Place into both eyes.      cholecalciferol, vitamin D3, (VITAMIN D3) 50 mcg (2,000 unit) Cap Take 1 capsule by mouth once daily.      cyanocobalamin 2000 MCG tablet Take 2,000 mcg by mouth once daily.      folic acid (FOLVITE) 1 MG tablet Take 1 mg by mouth once daily.      ketorolac 0.5% (ACULAR) 0.5 % Drop Place 1 drop into both eyes 4 (four) times daily as needed.      krill-om-3-dha-epa-phospho-ast (MEGARED OMEGA-3 KRILL OIL) 773-758-62-64 mg Cap Take 1 tablet by mouth once daily.      methotrexate 2.5 MG Tab Take by mouth.      zinc gluconate 30 mg Tab Take 30 tablets by mouth once daily.      ZINC-15 66 mg Tab Take 1 tablet by mouth.      allopurinoL (ZYLOPRIM) 100 MG tablet Take 1 tablet (100 mg total) by mouth 2 (two) times daily. 180 tablet 1    atorvastatin (LIPITOR) 40 MG tablet Take 1 tablet (40 mg total) by mouth once daily. 90 tablet 1    cetirizine (ZYRTEC) 10 MG tablet Take 1 tablet (10 mg total) by mouth once daily. 90 tablet 1    famotidine (PEPCID) 20 MG tablet Take 1 tablet (20 mg total) by mouth Daily. 90 tablet 1    metoprolol succinate (TOPROL-XL) 25 MG 24 hr tablet TAKE 1/2 (ONE-HALF) TABLET BY MOUTH IN THE MORNING AND 1 AT BEDTIME 180 tablet 1     No current facility-administered medications for this visit.       Allergies  Review of  patient's allergies indicates:  No Known Allergies    Physical Examination     Vitals:    04/11/24 0848   BP: 122/80   Pulse: 86   Resp: 18   Temp: 98.1 °F (36.7 °C)     Physical Exam  Constitutional:       General: He is not in acute distress.  HENT:      Nose: Nose normal.      Mouth/Throat:      Mouth: Mucous membranes are moist.   Eyes:      Extraocular Movements: Extraocular movements intact.   Cardiovascular:      Rate and Rhythm: Normal rate.   Pulmonary:      Effort: Pulmonary effort is normal.   Abdominal:      General: Bowel sounds are normal.      Palpations: Abdomen is soft.   Musculoskeletal:         General: Normal range of motion.      Cervical back: Normal range of motion.   Skin:     General: Skin is warm.   Neurological:      Mental Status: He is alert.   Psychiatric:         Behavior: Behavior normal.           Imaging / Labs     Office Visit on 04/11/2024   Component Date Value Ref Range Status    PSA Total 04/11/2024 1.050  <=4.000 ng/mL Final    Triglycerides 04/11/2024 173 (H)  35 - 150 mg/dL Final    Cholesterol 04/11/2024 143  0 - 200 mg/dL Final    HDL Cholesterol 04/11/2024 35 (L)  40 - 60 mg/dL Final    Cholesterol/HDL Ratio (Risk Factor) 04/11/2024 4.1   Final    Non-HDL 04/11/2024 108  mg/dL Final    LDL Calculated 04/11/2024 73  mg/dL Final    LDL/HDL 04/11/2024 2.1   Final    VLDL 04/11/2024 35  mg/dL Final    Sodium 04/11/2024 139  136 - 145 mmol/L Final    Potassium 04/11/2024 4.4  3.5 - 5.1 mmol/L Final    Chloride 04/11/2024 110 (H)  98 - 107 mmol/L Final    CO2 04/11/2024 22  21 - 32 mmol/L Final    Anion Gap 04/11/2024 11  7 - 16 mmol/L Final    Glucose 04/11/2024 102  74 - 106 mg/dL Final    BUN 04/11/2024 15  7 - 18 mg/dL Final    Creatinine 04/11/2024 0.97  0.70 - 1.30 mg/dL Final    BUN/Creatinine Ratio 04/11/2024 15  6 - 20 Final    Calcium 04/11/2024 9.6  8.5 - 10.1 mg/dL Final    Total Protein 04/11/2024 7.0  6.4 - 8.2 g/dL Final    Albumin 04/11/2024 3.7  3.5 - 5.0 g/dL  Final    Globulin 04/11/2024 3.3  2.0 - 4.0 g/dL Final    A/G Ratio 04/11/2024 1.1   Final    Bilirubin, Total 04/11/2024 0.9  >0.0 - 1.2 mg/dL Final    Alk Phos 04/11/2024 105  45 - 115 U/L Final    ALT 04/11/2024 33  16 - 61 U/L Final    AST 04/11/2024 25  15 - 37 U/L Final    eGFR 04/11/2024 81  >=60 mL/min/1.73m2 Final    TSH 04/11/2024 3.420  0.358 - 3.740 uIU/mL Final    Uric Acid 04/11/2024 4.7  3.5 - 7.2 mg/dL Final    Vitamin B12 04/11/2024 861  193 - 986 pg/mL Final    Folate 04/11/2024 >20.0 (H)  3.1 - 17.5 ng/mL Final    WBC 04/11/2024 6.86  4.50 - 11.00 K/uL Final    RBC 04/11/2024 5.02  4.60 - 6.20 M/uL Final    Hemoglobin 04/11/2024 15.7  13.5 - 18.0 g/dL Final    Hematocrit 04/11/2024 47.0  40.0 - 54.0 % Final    MCV 04/11/2024 93.6  80.0 - 96.0 fL Final    MCH 04/11/2024 31.3 (H)  27.0 - 31.0 pg Final    MCHC 04/11/2024 33.4  32.0 - 36.0 g/dL Final    RDW 04/11/2024 14.0  11.5 - 14.5 % Final    Platelet Count 04/11/2024 148 (L)  150 - 400 K/uL Final    MPV 04/11/2024 12.1  9.4 - 12.4 fL Final    Neutrophils % 04/11/2024 62.6  53.0 - 65.0 % Final    Lymphocytes % 04/11/2024 24.2 (L)  27.0 - 41.0 % Final    Monocytes % 04/11/2024 9.9 (H)  2.0 - 6.0 % Final    Eosinophils % 04/11/2024 2.6  1.0 - 4.0 % Final    Basophils % 04/11/2024 0.4  0.0 - 1.0 % Final    Immature Granulocytes % 04/11/2024 0.3  0.0 - 0.4 % Final    nRBC, Auto 04/11/2024 0.0  <=0.0 % Final    Neutrophils, Abs 04/11/2024 4.29  1.80 - 7.70 K/uL Final    Lymphocytes, Absolute 04/11/2024 1.66  1.00 - 4.80 K/uL Final    Monocytes, Absolute 04/11/2024 0.68  0.00 - 0.80 K/uL Final    Eosinophils, Absolute 04/11/2024 0.18  0.00 - 0.50 K/uL Final    Basophils, Absolute 04/11/2024 0.03  0.00 - 0.20 K/uL Final    Immature Granulocytes, Absolute 04/11/2024 0.02  0.00 - 0.04 K/uL Final    nRBC, Absolute 04/11/2024 0.00  <=0.00 x10e3/uL Final    Diff Type 04/11/2024 Auto   Final    Vitamin D 25-Hydroxy, Blood 04/11/2024 54.3  ng/mL Final     No  image results found.      Assessment and Plan (including Health Maintenance)      Problem List  Smart Sets  Document Outside HM   :    Health Maintenance Due   Topic Date Due    Shingles Vaccine (1 of 2) Never done    RSV Vaccine (Age 60+ and Pregnant patients) (1 - 1-dose 60+ series) Never done    Influenza Vaccine (1) 09/01/2023    COVID-19 Vaccine (4 - 2023-24 season) 09/01/2023       Problem List Items Addressed This Visit          Cardiac/Vascular    Primary hypertension - Primary    Current Assessment & Plan     Condition is stable   Will get fasting labs today  Continue current plan         Relevant Medications    metoprolol succinate (TOPROL-XL) 25 MG 24 hr tablet    Other Relevant Orders    CBC Auto Differential (Completed)    Mixed hyperlipidemia    Current Assessment & Plan     Lab Results   Component Value Date    TSH 1.410 06/02/2023     Will collect fasting lipid today  Continue current plan          Relevant Orders    Lipid Panel (Completed)    Comprehensive Metabolic Panel (Completed)       Immunology/Multi System    Drug-induced immunodeficiency    Current Assessment & Plan     Currently being treated for RA with methotrexate  He is followed by Dr Waters in [place]   Reports he is doing well            GI    Gastroesophageal reflux disease without esophagitis    Relevant Medications    famotidine (PEPCID) 20 MG tablet       Orthopedic    Gout    Relevant Medications    allopurinoL (ZYLOPRIM) 100 MG tablet    Other Relevant Orders    Uric Acid (Completed)    Vitamin D (Completed)     Other Visit Diagnoses       Body mass index 28.0-28.9, adult        Relevant Orders    TSH (Completed)    Encounter for screening for malignant neoplasm of prostate        Relevant Orders    PSA, Screening (Completed)    Encounter for initial annual wellness visit (AWV) in Medicare patient        Relevant Orders    Vitamin B12 & Folate (Completed)    History of obesity        Relevant Orders    TSH (Completed)     Vitamin B12 & Folate (Completed)    Allergic rhinitis, unspecified seasonality, unspecified trigger        Relevant Medications    cetirizine (ZYRTEC) 10 MG tablet    Rheumatoid arthritis of other site, unspecified whether rheumatoid factor present        Relevant Orders    Vitamin D (Completed)    Myalgia, other site        Relevant Orders    Vitamin D (Completed)            Health Maintenance Topics with due status: Not Due       Topic Last Completion Date    TETANUS VACCINE 06/16/2022    Colorectal Cancer Screening 08/15/2023    PROSTATE-SPECIFIC ANTIGEN 04/11/2024    Lipid Panel 04/11/2024       Future Appointments   Date Time Provider Department Center   5/16/2024  8:00 AM AWV NURSE, [facility] [place]   10/14/2024  8:40 AM Shanta Cason FNP [facility] [place]          Signature:  KATHIE Finnegan [facility] [address] [address]  [phone]    Date of encounter: 4/11/24

## 2024-04-11 NOTE — PROGRESS NOTES
Health Maintenance Due   Topic Date Due    Shingles Vaccine (1 of 2) Never done    RSV Vaccine (Age 60+ and Pregnant patients) (1 - 1-dose 60+ series) Never done    Influenza Vaccine (1) 09/01/2023    COVID-19 Vaccine (4 - 2023-24 season) 09/01/2023    PROSTATE-SPECIFIC ANTIGEN  12/01/2023     Discussed care gaps with pt  Pt is not interested in any vaccines today

## 2024-04-15 ENCOUNTER — TELEPHONE (OUTPATIENT)
Dept: FAMILY MEDICINE | Facility: CLINIC | Age: 74
End: 2024-04-15
Payer: MEDICARE

## 2024-04-15 DIAGNOSIS — E78.2 MIXED HYPERLIPIDEMIA: Primary | ICD-10-CM

## 2024-04-15 RX ORDER — ATORVASTATIN CALCIUM 40 MG/1
40 TABLET, FILM COATED ORAL DAILY
Qty: 90 TABLET | Refills: 1 | Status: SHIPPED | OUTPATIENT
Start: 2024-04-15 | End: 2024-04-16 | Stop reason: SDUPTHER

## 2024-04-15 NOTE — TELEPHONE ENCOUNTER
----- Message from KATHIE Finnegan sent at 4/15/2024  8:58 AM CDT -----  Triglycerides are elevated. I have increased atorvastatin. If he has any problems with that, let us know. Other labs are stable. Follow up about 6 mo

## 2024-04-16 DIAGNOSIS — E78.2 MIXED HYPERLIPIDEMIA: ICD-10-CM

## 2024-04-16 RX ORDER — ATORVASTATIN CALCIUM 40 MG/1
40 TABLET, FILM COATED ORAL DAILY
Qty: 90 TABLET | Refills: 1 | Status: SHIPPED | OUTPATIENT
Start: 2024-04-16 | End: 2025-04-16

## 2024-04-19 ENCOUNTER — OFFICE VISIT (OUTPATIENT)
Dept: FAMILY MEDICINE | Facility: CLINIC | Age: 74
End: 2024-04-19
Payer: MEDICARE

## 2024-04-19 VITALS
SYSTOLIC BLOOD PRESSURE: 126 MMHG | RESPIRATION RATE: 18 BRPM | DIASTOLIC BLOOD PRESSURE: 83 MMHG | HEIGHT: 72 IN | TEMPERATURE: 98 F | BODY MASS INDEX: 28.36 KG/M2 | WEIGHT: 209.38 LBS | HEART RATE: 65 BPM | OXYGEN SATURATION: 96 %

## 2024-04-19 DIAGNOSIS — T14.8XXA MUSCLE STRAIN: Primary | ICD-10-CM

## 2024-04-19 PROCEDURE — 99213 OFFICE O/P EST LOW 20 MIN: CPT | Mod: ,,, | Performed by: STUDENT IN AN ORGANIZED HEALTH CARE EDUCATION/TRAINING PROGRAM

## 2024-04-19 RX ORDER — METHOTREXATE 2.5 MG/1
2.5 TABLET ORAL
COMMUNITY
Start: 2024-04-12

## 2024-04-19 RX ORDER — ATORVASTATIN CALCIUM 20 MG/1
20 TABLET, FILM COATED ORAL DAILY
COMMUNITY
Start: 2024-04-12

## 2024-04-19 RX ORDER — METHOCARBAMOL 500 MG/1
500 TABLET, FILM COATED ORAL 2 TIMES DAILY PRN
Qty: 20 TABLET | Refills: 0 | Status: SHIPPED | OUTPATIENT
Start: 2024-04-19 | End: 2024-04-29

## 2024-04-19 NOTE — PROGRESS NOTES
Progress Note     GUILHERME RODRÍGUEZ MD   [address]  [place] [address]     PATIENT NAME: Mack Moeller  : 1950  DATE: 24  MRN: [id number]      Billing Provider: GUILHERME RODRÍGUEZ MD  Level of Service: [place] OFFICE/OUTPT VISIT, EST, LEVL III, 20-29 MIN  Patient PCP Information       Provider PCP Type    KATHIE Finnegan General                Back Pain (Patient states that he may have pulled a muscle ain his back and states that the pain has been going for a week now. Patient states that it only hurts when he is standing. ) and Health Maintenance (Patient declined all vaccines at this time. )      SUBJECTIVE:     Mack Moeller is a 73 y.o.male who presents to clinic for Back Pain (Patient states that he may have pulled a muscle ain his back and states that the pain has been going for a week now. Patient states that it only hurts when he is standing. ) and Health Maintenance (Patient declined all vaccines at this time. )    Patient presents to clinic today with left sided low back pain.  Patient notes onset earlier in the week.  Patient has been soaking his feet each evening per his dermatologist's recommendations.  Patient notes that he has been sitting in a hard plastic chair and he was had some mild discomfort when attempting to go from sitting to standing.  However, it progressively worsened and we can quite severe earlier in the week.  He was now using an alternative chair.  He notes now his back hurts when he goes from sitting to standing only.  He denies any pain with ambulation.  He notes some discomfort with certain bending motions.  He denies radiation of pain.  He has been taking Aleve prior to bed.  He was tried heating pad as well.    All other pertinent review of systems negative. Please see HPI for details.     Past Medical History:  has a past medical history of Cardiac arrhythmia (2020), Diverticular disease of colon, GERD (gastroesophageal reflux  disease), Hyperlipidemia, Hypertension, and Parkinson's disease (08/24/2020).   Past Surgical History:  has a past surgical history that includes Hemorrhoid surgery; Colonoscopy w/ polypectomy (07/10/2018); and Cardiac defibrillator placement.  Family History: family history includes Hypertension in his father.  Social History:  reports that he has never smoked. He has never been exposed to tobacco smoke. He has never used smokeless tobacco. He reports that he does not drink alcohol and does not use drugs.  Allergies: Review of patient's allergies indicates:  No Known Allergies      Current Outpatient Medications:     allopurinoL (ZYLOPRIM) 100 MG tablet, Take 1 tablet (100 mg total) by mouth 2 (two) times daily., Disp: 180 tablet, Rfl: 1    ascorbic acid, vitamin C, (VITAMIN C) 500 MG tablet, Take 500 mg by mouth once daily., Disp: , Rfl:     aspirin (ECOTRIN) 81 MG EC tablet, Take 81 mg by mouth once daily., Disp: , Rfl:     atorvastatin (LIPITOR) 20 MG tablet, Take 20 mg by mouth once daily., Disp: , Rfl:     atorvastatin (LIPITOR) 40 MG tablet, Take 1 tablet (40 mg total) by mouth once daily., Disp: 90 tablet, Rfl: 1    brimonidine 0.2% (ALPHAGAN) 0.2 % Drop, Place into both eyes., Disp: , Rfl:     cetirizine (ZYRTEC) 10 MG tablet, Take 1 tablet (10 mg total) by mouth once daily., Disp: 90 tablet, Rfl: 1    cholecalciferol, vitamin D3, (VITAMIN D3) 50 mcg (2,000 unit) Cap, Take 1 capsule by mouth once daily., Disp: , Rfl:     cyanocobalamin 2000 MCG tablet, Take 2,000 mcg by mouth once daily., Disp: , Rfl:     famotidine (PEPCID) 20 MG tablet, Take 1 tablet (20 mg total) by mouth Daily., Disp: 90 tablet, Rfl: 1    folic acid (FOLVITE) 1 MG tablet, Take 1 mg by mouth once daily., Disp: , Rfl:     ketorolac 0.5% (ACULAR) 0.5 % Drop, Place 1 drop into both eyes 4 (four) times daily as needed., Disp: , Rfl:     krill-om-3-dha-epa-phospho-ast (MEGARED OMEGA-3 KRILL OIL) 677-622-79-64 mg Cap, Take 1 tablet by mouth  once daily., Disp: , Rfl:     methotrexate 2.5 MG Tab, Take by mouth., Disp: , Rfl:     methotrexate 2.5 MG Tab, Take 2.5 mg by mouth every 7 days., Disp: , Rfl:     metoprolol succinate (TOPROL-XL) 25 MG 24 hr tablet, TAKE 1/2 (ONE-HALF) TABLET BY MOUTH IN THE MORNING AND 1 AT BEDTIME, Disp: 180 tablet, Rfl: 1    zinc gluconate 30 mg Tab, Take 30 tablets by mouth once daily., Disp: , Rfl:     ZINC-15 66 mg Tab, Take 1 tablet by mouth., Disp: , Rfl:     methocarbamoL (ROBAXIN) 500 MG Tab, Take 1 tablet (500 mg total) by mouth 2 (two) times daily as needed (Muscle spasm)., Disp: 20 tablet, Rfl: 0   OBJECTIVE:     Vital Signs   /83 (BP Location: Left arm, Patient Position: Sitting, BP Method: Medium (Automatic))   Pulse 65   Temp 97.5 °F (36.4 °C) (Oral)   Resp 18   Ht 6' (1.829 m)   Wt 95 kg (209 lb 6.4 oz)   SpO2 96%   BMI 28.40 kg/m²     Physical Exam  Constitutional:       General: He is not in acute distress.     Appearance: Normal appearance. He is not ill-appearing.   HENT:      Head: Normocephalic and atraumatic.   Eyes:      Extraocular Movements: Extraocular movements intact.      Pupils: Pupils are equal, round, and reactive to light.   Cardiovascular:      Rate and Rhythm: Normal rate and regular rhythm.      Heart sounds: No murmur heard.     No friction rub. No gallop.   Pulmonary:      Effort: Pulmonary effort is normal. No respiratory distress.      Breath sounds: Normal breath sounds. No wheezing, rhonchi or rales.   Abdominal:      Palpations: Abdomen is soft.      Tenderness: There is no abdominal tenderness. There is no guarding or rebound.   Musculoskeletal:         General: Normal range of motion.      Comments: Tenderness to palpation over left sided paraspinal muscles.   Skin:     General: Skin is warm and dry.      Capillary Refill: Capillary refill takes less than 2 seconds.   Neurological:      General: No focal deficit present.      Mental Status: He is alert.      Sensory: No  sensory deficit.      Motor: No weakness.   Psychiatric:         Mood and Affect: Mood normal.         Behavior: Behavior normal.         ASSESSMENT/PLAN:     1. Muscle strain  -     methocarbamoL (ROBAXIN) 500 MG Tab; Take 1 tablet (500 mg total) by mouth 2 (two) times daily as needed (Muscle spasm).  Dispense: 20 tablet; Refill: 0    Patient's symptoms consistent with muscle strain.  Patient without emergency symptoms.  Patient provided with stretches to perform at home.  Patient also provided prescription for Robaxin.  Discussed potential side effects.  Patient verbalized understanding.  Patient to continue Alleve but should take it twice daily for the next week.  Patient he will also alternate heat/ice.  Patient to follow up if symptoms worsen or fail to improve with conservative management.    Follow up for Next scheduled appointment with PCP..      GUILHERME RODRÍGUEZ MD  04/19/2024    Due to voice recognition software, sound alike and misspelled words may be contained in the documentation.

## 2024-07-09 DIAGNOSIS — Z71.89 COMPLEX CARE COORDINATION: ICD-10-CM

## 2024-08-09 ENCOUNTER — TELEPHONE (OUTPATIENT)
Dept: FAMILY MEDICINE | Facility: CLINIC | Age: 74
End: 2024-08-09
Payer: MEDICARE

## 2024-08-12 ENCOUNTER — TELEPHONE (OUTPATIENT)
Dept: FAMILY MEDICINE | Facility: CLINIC | Age: 74
End: 2024-08-12
Payer: MEDICARE

## 2024-08-12 DIAGNOSIS — D69.6 LOW PLATELET COUNT: Primary | ICD-10-CM

## 2024-08-12 NOTE — TELEPHONE ENCOUNTER
order placed for CBC recheck r/t low platelet count.  spoke w/ pts wife (Francine) and made her aware that he needs to come in for lab only.  voiced understanding.

## 2024-10-03 DIAGNOSIS — J30.9 ALLERGIC RHINITIS, UNSPECIFIED SEASONALITY, UNSPECIFIED TRIGGER: ICD-10-CM

## 2024-10-03 RX ORDER — CETIRIZINE HYDROCHLORIDE 10 MG/1
10 TABLET ORAL DAILY
Qty: 90 TABLET | Refills: 1 | Status: SHIPPED | OUTPATIENT
Start: 2024-10-03 | End: 2025-04-01

## 2024-10-14 ENCOUNTER — OFFICE VISIT (OUTPATIENT)
Dept: FAMILY MEDICINE | Facility: CLINIC | Age: 74
End: 2024-10-14
Payer: MEDICARE

## 2024-10-14 VITALS
BODY MASS INDEX: 28.04 KG/M2 | OXYGEN SATURATION: 98 % | TEMPERATURE: 98 F | DIASTOLIC BLOOD PRESSURE: 76 MMHG | SYSTOLIC BLOOD PRESSURE: 128 MMHG | WEIGHT: 207 LBS | HEIGHT: 72 IN | HEART RATE: 62 BPM | RESPIRATION RATE: 18 BRPM

## 2024-10-14 DIAGNOSIS — Z79.899 ENCOUNTER FOR LONG-TERM (CURRENT) USE OF MEDICATIONS: ICD-10-CM

## 2024-10-14 DIAGNOSIS — E78.2 MIXED HYPERLIPIDEMIA: ICD-10-CM

## 2024-10-14 DIAGNOSIS — I10 PRIMARY HYPERTENSION: Primary | ICD-10-CM

## 2024-10-14 DIAGNOSIS — G57.93 NEUROPATHY INVOLVING BOTH LOWER EXTREMITIES: ICD-10-CM

## 2024-10-14 DIAGNOSIS — Z23 IMMUNIZATION DUE: ICD-10-CM

## 2024-10-14 LAB
25(OH)D3 SERPL-MCNC: 40.2 NG/ML
ALBUMIN SERPL BCP-MCNC: 3.6 G/DL (ref 3.5–5)
ALBUMIN/GLOB SERPL: 1 {RATIO}
ALP SERPL-CCNC: 95 U/L (ref 45–115)
ALT SERPL W P-5'-P-CCNC: 28 U/L (ref 16–61)
ANION GAP SERPL CALCULATED.3IONS-SCNC: 6 MMOL/L (ref 7–16)
AST SERPL W P-5'-P-CCNC: 27 U/L (ref 15–37)
BASOPHILS # BLD AUTO: 0.05 K/UL (ref 0–0.2)
BASOPHILS NFR BLD AUTO: 0.8 % (ref 0–1)
BILIRUB SERPL-MCNC: 1.1 MG/DL (ref ?–1.2)
BUN SERPL-MCNC: 12 MG/DL (ref 7–18)
BUN/CREAT SERPL: 12 (ref 6–20)
CALCIUM SERPL-MCNC: 9.3 MG/DL (ref 8.5–10.1)
CHLORIDE SERPL-SCNC: 112 MMOL/L (ref 98–107)
CHOLEST SERPL-MCNC: 129 MG/DL (ref 0–200)
CHOLEST/HDLC SERPL: 3.8 {RATIO}
CO2 SERPL-SCNC: 28 MMOL/L (ref 21–32)
CREAT SERPL-MCNC: 0.98 MG/DL (ref 0.7–1.3)
DIFFERENTIAL METHOD BLD: ABNORMAL
EGFR (NO RACE VARIABLE) (RUSH/TITUS): 80 ML/MIN/1.73M2
EOSINOPHIL # BLD AUTO: 0.16 K/UL (ref 0–0.5)
EOSINOPHIL NFR BLD AUTO: 2.6 % (ref 1–4)
ERYTHROCYTE [DISTWIDTH] IN BLOOD BY AUTOMATED COUNT: 13.5 % (ref 11.5–14.5)
EST. AVERAGE GLUCOSE BLD GHB EST-MCNC: 105 MG/DL
FOLATE SERPL-MCNC: >20 NG/ML (ref 3.1–17.5)
GLOBULIN SER-MCNC: 3.5 G/DL (ref 2–4)
GLUCOSE SERPL-MCNC: 104 MG/DL (ref 74–106)
HBA1C MFR BLD HPLC: 5.3 % (ref 4.5–6.6)
HCT VFR BLD AUTO: 47.4 % (ref 40–54)
HDLC SERPL-MCNC: 34 MG/DL (ref 40–60)
HGB BLD-MCNC: 15.9 G/DL (ref 13.5–18)
IMM GRANULOCYTES # BLD AUTO: 0.02 K/UL (ref 0–0.04)
IMM GRANULOCYTES NFR BLD: 0.3 % (ref 0–0.4)
LDLC SERPL CALC-MCNC: 62 MG/DL
LDLC/HDLC SERPL: 1.8 {RATIO}
LYMPHOCYTES # BLD AUTO: 1.55 K/UL (ref 1–4.8)
LYMPHOCYTES NFR BLD AUTO: 25.3 % (ref 27–41)
MCH RBC QN AUTO: 32.1 PG (ref 27–31)
MCHC RBC AUTO-ENTMCNC: 33.5 G/DL (ref 32–36)
MCV RBC AUTO: 95.8 FL (ref 80–96)
MONOCYTES # BLD AUTO: 0.57 K/UL (ref 0–0.8)
MONOCYTES NFR BLD AUTO: 9.3 % (ref 2–6)
MPC BLD CALC-MCNC: 11.5 FL (ref 9.4–12.4)
NEUTROPHILS # BLD AUTO: 3.78 K/UL (ref 1.8–7.7)
NEUTROPHILS NFR BLD AUTO: 61.7 % (ref 53–65)
NONHDLC SERPL-MCNC: 95 MG/DL
NRBC # BLD AUTO: 0 X10E3/UL
NRBC, AUTO (.00): 0 %
PLATELET # BLD AUTO: 145 K/UL (ref 150–400)
POTASSIUM SERPL-SCNC: 4.3 MMOL/L (ref 3.5–5.1)
PROT SERPL-MCNC: 7.1 G/DL (ref 6.4–8.2)
RBC # BLD AUTO: 4.95 M/UL (ref 4.6–6.2)
SODIUM SERPL-SCNC: 142 MMOL/L (ref 136–145)
TRIGL SERPL-MCNC: 165 MG/DL (ref 35–150)
VIT B12 SERPL-MCNC: 1183 PG/ML (ref 193–986)
VLDLC SERPL-MCNC: 33 MG/DL
WBC # BLD AUTO: 6.13 K/UL (ref 4.5–11)

## 2024-10-14 PROCEDURE — 82746 ASSAY OF FOLIC ACID SERUM: CPT | Mod: ,,, | Performed by: CLINICAL MEDICAL LABORATORY

## 2024-10-14 PROCEDURE — 82607 VITAMIN B-12: CPT | Mod: ,,, | Performed by: CLINICAL MEDICAL LABORATORY

## 2024-10-14 PROCEDURE — 83036 HEMOGLOBIN GLYCOSYLATED A1C: CPT | Mod: ,,, | Performed by: CLINICAL MEDICAL LABORATORY

## 2024-10-14 PROCEDURE — 82306 VITAMIN D 25 HYDROXY: CPT | Mod: ,,, | Performed by: CLINICAL MEDICAL LABORATORY

## 2024-10-14 PROCEDURE — 85025 COMPLETE CBC W/AUTO DIFF WBC: CPT | Mod: ,,, | Performed by: CLINICAL MEDICAL LABORATORY

## 2024-10-14 PROCEDURE — 90653 IIV ADJUVANT VACCINE IM: CPT | Mod: ,,, | Performed by: NURSE PRACTITIONER

## 2024-10-14 PROCEDURE — G0008 ADMIN INFLUENZA VIRUS VAC: HCPCS | Mod: ,,, | Performed by: NURSE PRACTITIONER

## 2024-10-14 PROCEDURE — 80053 COMPREHEN METABOLIC PANEL: CPT | Mod: ,,, | Performed by: CLINICAL MEDICAL LABORATORY

## 2024-10-14 PROCEDURE — 80061 LIPID PANEL: CPT | Mod: ,,, | Performed by: CLINICAL MEDICAL LABORATORY

## 2024-10-14 PROCEDURE — 99214 OFFICE O/P EST MOD 30 MIN: CPT | Mod: ,,, | Performed by: NURSE PRACTITIONER

## 2024-10-14 RX ORDER — GABAPENTIN 100 MG/1
100 CAPSULE ORAL 2 TIMES DAILY
Qty: 60 CAPSULE | Refills: 2 | Status: SHIPPED | OUTPATIENT
Start: 2024-10-14 | End: 2025-10-14

## 2024-10-14 NOTE — PROGRESS NOTES
KATHIE Finnegan   [facility]  [address] [address]  [phone]      PATIENT NAME: Mack Moeller  : 1950  DATE: 10/14/24  MRN: [id number]      Billing Provider: KATHIE Finnegan  Level of Service:   Patient PCP Information       Provider PCP Type    KATHIE Finnegan General            Reason for Visit / Chief Complaint: Follow-up (Pt is here for 6 month follow up. Pt also wants to get his feet checked while he is here. He states he has had some burning, stinging, and numbness going on. ), Health Maintenance (Discussed care gaps with pt. He would like to get his flu vaccine today in clinic. Denies all other care gaps today. ), and Flu Vaccine       Update PCP  Update Chief Complaint         History of Present Illness / Problem Focused Workflow     74 year old male presents for follow up. Complaints of bilat lower ext burning and tingling. Worse at bedtime.   Also requests flu vaccine today. He is fasting for labs today.     Review of Systems     Review of Systems   Constitutional:  Negative for fatigue and fever.   HENT:  Negative for congestion.    Eyes:  Positive for visual disturbance (right eye blindness).   Respiratory:  Negative for cough and shortness of breath.    Cardiovascular:  Negative for chest pain.   Gastrointestinal:  Negative for abdominal pain, constipation, diarrhea and vomiting.   Endocrine: Negative for polydipsia and polyuria.   Musculoskeletal:  Positive for arthralgias and myalgias. Negative for gait problem.   Allergic/Immunologic: Positive for environmental allergies.   Neurological:  Positive for numbness (bilat lower ext). Negative for dizziness, weakness and headaches.   Psychiatric/Behavioral:  Negative for dysphoric mood. The patient is not nervous/anxious.        Medical / Social / Family History     Past Medical History:   Diagnosis Date    Cardiac arrhythmia 2020    Diverticular disease of  colon     GERD (gastroesophageal reflux disease)     Hyperlipidemia     Hypertension     Parkinson's disease 08/24/2020       Past Surgical History:   Procedure Laterality Date    CARDIAC DEFIBRILLATOR PLACEMENT      COLONOSCOPY W/ POLYPECTOMY  07/10/2018    HEMORRHOID SURGERY         Social History    reports that he has never smoked. He has never been exposed to tobacco smoke. He has never used smokeless tobacco. He reports that he does not drink alcohol and does not use drugs.    Family History  's family history includes Hypertension in his father.    Medications and Allergies     Medications  Outpatient Medications Marked as Taking for the 10/14/24 encounter (Office Visit) with Shanta Cason FNP   Medication Sig Dispense Refill    allopurinoL (ZYLOPRIM) 100 MG tablet Take 1 tablet (100 mg total) by mouth 2 (two) times daily. 180 tablet 1    ascorbic acid, vitamin C, (VITAMIN C) 500 MG tablet Take 500 mg by mouth once daily.      aspirin (ECOTRIN) 81 MG EC tablet Take 81 mg by mouth once daily.      atorvastatin (LIPITOR) 40 MG tablet Take 1 tablet (40 mg total) by mouth once daily. 90 tablet 1    brimonidine 0.2% (ALPHAGAN) 0.2 % Drop Place into both eyes.      cetirizine (ZYRTEC) 10 MG tablet Take 1 tablet (10 mg total) by mouth once daily. 90 tablet 1    cholecalciferol, vitamin D3, (VITAMIN D3) 50 mcg (2,000 unit) Cap Take 1 capsule by mouth once daily.      cyanocobalamin 2000 MCG tablet Take 2,000 mcg by mouth once daily.      famotidine (PEPCID) 20 MG tablet Take 1 tablet (20 mg total) by mouth Daily. 90 tablet 1    folic acid (FOLVITE) 1 MG tablet Take 1 mg by mouth once daily.      ketorolac 0.5% (ACULAR) 0.5 % Drop Place 1 drop into both eyes 4 (four) times daily as needed.      krill-om-3-dha-epa-phospho-ast (MEGARED OMEGA-3 KRILL OIL) 266-673-88-64 mg Cap Take 1 tablet by mouth once daily.      methotrexate 2.5 MG Tab Take 2.5 mg by mouth every 7 days.      metoprolol succinate (TOPROL-XL) 25  MG 24 hr tablet TAKE 1/2 (ONE-HALF) TABLET BY MOUTH IN THE MORNING AND 1 AT BEDTIME 180 tablet 1    ZINC-15 66 mg Tab Take 1 tablet by mouth.       Current Facility-Administered Medications for the 10/14/24 encounter (Office Visit) with Shanta Cason FNP   Medication Dose Route Frequency Provider Last Rate Last Admin    [COMPLETED] influenza (adjuvanted) (Fluad) 45 mcg/0.5 mL IM vaccine (> or = 66 yo) 0.5 mL  0.5 mL Intramuscular 1 time in Clinic/HOD Shanta Cason FNP   0.5 mL at 10/14/24 0859       Allergies  Review of patient's allergies indicates:  No Known Allergies    Physical Examination     Vitals:    10/14/24 0854   BP: 128/76   Pulse: 62   Resp: 18   Temp: 97.6 °F (36.4 °C)     Physical Exam  Constitutional:       General: He is not in acute distress.  HENT:      Nose: Nose normal.      Mouth/Throat:      Mouth: Mucous membranes are moist.   Eyes:      Extraocular Movements: Extraocular movements intact.   Cardiovascular:      Rate and Rhythm: Normal rate.   Pulmonary:      Effort: Pulmonary effort is normal.   Abdominal:      General: Bowel sounds are normal.      Palpations: Abdomen is soft.   Musculoskeletal:         General: Normal range of motion.      Cervical back: Normal range of motion.   Skin:     General: Skin is warm.   Neurological:      Mental Status: He is alert.   Psychiatric:         Behavior: Behavior normal.           Imaging / Labs     No visits with results within 1 Day(s) from this visit.   Latest known visit with results is:   Lab Visit on 08/13/2024   Component Date Value Ref Range Status    WBC 08/13/2024 6.62  4.50 - 11.00 K/uL Final    RBC 08/13/2024 4.92  4.60 - 6.20 M/uL Final    Hemoglobin 08/13/2024 15.8  13.5 - 18.0 g/dL Final    Hematocrit 08/13/2024 47.2  40.0 - 54.0 % Final    MCV 08/13/2024 95.9  80.0 - 96.0 fL Final    MCH 08/13/2024 32.1 (H)  27.0 - 31.0 pg Final    MCHC 08/13/2024 33.5  32.0 - 36.0 g/dL Final    RDW 08/13/2024 13.9  11.5 - 14.5 % Final    Platelet  Count 08/13/2024 152  150 - 400 K/uL Final    MPV 08/13/2024 11.5  9.4 - 12.4 fL Final    Neutrophils % 08/13/2024 59.7  53.0 - 65.0 % Final    Lymphocytes % 08/13/2024 27.5  27.0 - 41.0 % Final    Monocytes % 08/13/2024 9.7 (H)  2.0 - 6.0 % Final    Eosinophils % 08/13/2024 2.4  1.0 - 4.0 % Final    Basophils % 08/13/2024 0.5  0.0 - 1.0 % Final    Immature Granulocytes % 08/13/2024 0.2  0.0 - 0.4 % Final    nRBC, Auto 08/13/2024 0.0  <=0.0 % Final    Neutrophils, Abs 08/13/2024 3.96  1.80 - 7.70 K/uL Final    Lymphocytes, Absolute 08/13/2024 1.82  1.00 - 4.80 K/uL Final    Monocytes, Absolute 08/13/2024 0.64  0.00 - 0.80 K/uL Final    Eosinophils, Absolute 08/13/2024 0.16  0.00 - 0.50 K/uL Final    Basophils, Absolute 08/13/2024 0.03  0.00 - 0.20 K/uL Final    Immature Granulocytes, Absolute 08/13/2024 0.01  0.00 - 0.04 K/uL Final    nRBC, Absolute 08/13/2024 0.00  <=0.00 x10e3/uL Final    Diff Type 08/13/2024 Auto   Final     No image results found.      Assessment and Plan (including Health Maintenance)      Problem List  Smart Sets  Document Outside HM   :    Health Maintenance Due   Topic Date Due    Shingles Vaccine (1 of 2) Never done    RSV Vaccine (Age 60+ and Pregnant patients) (1 - 1-dose 75+ series) Never done    COVID-19 Vaccine (4 - 2024-25 season) 09/01/2024       Problem List Items Addressed This Visit          Neuro    Neuropathy involving both lower extremities    Current Assessment & Plan     Reports burning and tingling to bilat lower ext. Worse at night. Discussed treatment options/risks/benefits; patient voices understanding. He does not want to have any testing or referrals done at this time. Will start gabapentin po BID. Will monitor for results.          Relevant Medications    gabapentin (NEURONTIN) 100 MG capsule       Cardiac/Vascular    Primary hypertension - Primary    Current Assessment & Plan     Condition is stable. Continue low sodium diet. Fasting labs today. Continue current plan.           Relevant Orders    CBC Auto Differential    Comprehensive Metabolic Panel    Mixed hyperlipidemia    Current Assessment & Plan     Lab Results   Component Value Date    CHOL 143 04/11/2024    CHOL 119 10/10/2023    CHOL 180 08/10/2023     Lab Results   Component Value Date    HDL 35 (L) 04/11/2024    HDL 36 (L) 10/10/2023    HDL 38 (L) 08/10/2023     Lab Results   Component Value Date    LDLCALC 73 04/11/2024    LDLCALC 46 10/10/2023    LDLCALC 99 08/10/2023     Lab Results   Component Value Date    TRIG 173 (H) 04/11/2024    TRIG 185 (H) 10/10/2023    TRIG 213 (H) 08/10/2023       Lab Results   Component Value Date    CHOLHDL 4.1 04/11/2024    CHOLHDL 3.3 10/10/2023    CHOLHDL 4.7 08/10/2023     Will get fasting lipids with labs today. Counseled on following low cholesterol diet. Continue current plan.          Relevant Orders    Lipid Panel     Other Visit Diagnoses       Encounter for long-term (current) use of medications        Relevant Orders    Vitamin D    Vitamin B12 & Folate    Hemoglobin A1C    Immunization due        Relevant Medications    influenza (adjuvanted) (Fluad) 45 mcg/0.5 mL IM vaccine (> or = 64 yo) 0.5 mL (Completed)            Health Maintenance Topics with due status: Not Due       Topic Last Completion Date    TETANUS VACCINE 06/16/2022    PROSTATE-SPECIFIC ANTIGEN 04/11/2024    Lipid Panel 04/11/2024       Future Appointments   Date Time Provider Department Center   11/21/2024  8:00 AM AWV NURSE, [facility]          Signature:  KATHIE Finnegan  [facility]  [address] [place] [address]  [phone]    Date of encounter: 10/14/24

## 2024-10-14 NOTE — ASSESSMENT & PLAN NOTE
Lab Results   Component Value Date    CHOL 143 04/11/2024    CHOL 119 10/10/2023    CHOL 180 08/10/2023     Lab Results   Component Value Date    HDL 35 (L) 04/11/2024    HDL 36 (L) 10/10/2023    HDL 38 (L) 08/10/2023     Lab Results   Component Value Date    LDLCALC 73 04/11/2024    LDLCALC 46 10/10/2023    LDLCALC 99 08/10/2023     Lab Results   Component Value Date    TRIG 173 (H) 04/11/2024    TRIG 185 (H) 10/10/2023    TRIG 213 (H) 08/10/2023       Lab Results   Component Value Date    CHOLHDL 4.1 04/11/2024    CHOLHDL 3.3 10/10/2023    CHOLHDL 4.7 08/10/2023     Will get fasting lipids with labs today. Counseled on following low cholesterol diet. Continue current plan.    done done n/a/done done done done

## 2024-10-14 NOTE — ASSESSMENT & PLAN NOTE
Reports burning and tingling to bilat lower ext. Worse at night. Discussed treatment options/risks/benefits; patient voices understanding. He does not want to have any testing or referrals done at this time. Will start gabapentin po BID. Will monitor for results.

## 2024-10-16 DIAGNOSIS — E78.2 MIXED HYPERLIPIDEMIA: Primary | ICD-10-CM

## 2024-10-16 RX ORDER — ATORVASTATIN CALCIUM 80 MG/1
80 TABLET, FILM COATED ORAL DAILY
Qty: 90 TABLET | Refills: 1 | Status: SHIPPED | OUTPATIENT
Start: 2024-10-16 | End: 2025-10-16

## 2024-11-07 DIAGNOSIS — I10 PRIMARY HYPERTENSION: ICD-10-CM

## 2024-11-07 RX ORDER — METOPROLOL SUCCINATE 25 MG/1
TABLET, EXTENDED RELEASE ORAL
Qty: 180 TABLET | Refills: 1 | Status: SHIPPED | OUTPATIENT
Start: 2024-11-07

## 2024-11-07 NOTE — TELEPHONE ENCOUNTER
Pt last seen in clinic on 10/14/24. His metoprolol was not sent in during that visit. Pt will be out of medication this weekend. Wanting this sent to [facility] so they will receive it before he runs out hopefully. Please advise.

## 2024-11-21 DIAGNOSIS — K21.9 GASTROESOPHAGEAL REFLUX DISEASE WITHOUT ESOPHAGITIS: ICD-10-CM

## 2024-11-25 RX ORDER — FAMOTIDINE 20 MG/1
20 TABLET, FILM COATED ORAL
Qty: 90 TABLET | Refills: 3 | Status: SHIPPED | OUTPATIENT
Start: 2024-11-25

## 2024-12-16 ENCOUNTER — OFFICE VISIT (OUTPATIENT)
Dept: FAMILY MEDICINE | Facility: CLINIC | Age: 74
End: 2024-12-16
Payer: MEDICARE

## 2024-12-16 DIAGNOSIS — J32.4 PANSINUSITIS, UNSPECIFIED CHRONICITY: Primary | ICD-10-CM

## 2024-12-16 DIAGNOSIS — R05.1 ACUTE COUGH: ICD-10-CM

## 2024-12-16 DIAGNOSIS — J02.9 SORE THROAT: ICD-10-CM

## 2024-12-16 LAB
CTP QC/QA: YES
MOLECULAR STREP A: NEGATIVE
POC MOLECULAR INFLUENZA A AGN: NEGATIVE
POC MOLECULAR INFLUENZA B AGN: NEGATIVE
SARS-COV-2 RDRP RESP QL NAA+PROBE: NEGATIVE

## 2024-12-16 PROCEDURE — 87651 STREP A DNA AMP PROBE: CPT | Mod: RHCUB | Performed by: NURSE PRACTITIONER

## 2024-12-16 PROCEDURE — 96372 THER/PROPH/DIAG INJ SC/IM: CPT | Mod: ,,, | Performed by: NURSE PRACTITIONER

## 2024-12-16 PROCEDURE — 87635 SARS-COV-2 COVID-19 AMP PRB: CPT | Mod: RHCUB | Performed by: NURSE PRACTITIONER

## 2024-12-16 PROCEDURE — 87502 INFLUENZA DNA AMP PROBE: CPT | Mod: RHCUB | Performed by: NURSE PRACTITIONER

## 2024-12-16 PROCEDURE — 99214 OFFICE O/P EST MOD 30 MIN: CPT | Mod: ,,, | Performed by: NURSE PRACTITIONER

## 2024-12-16 RX ORDER — AZITHROMYCIN 250 MG/1
TABLET, FILM COATED ORAL
Qty: 6 TABLET | Refills: 0 | Status: SHIPPED | OUTPATIENT
Start: 2024-12-16 | End: 2024-12-21

## 2024-12-16 RX ORDER — CEFTRIAXONE 1 G/1
1 INJECTION, POWDER, FOR SOLUTION INTRAMUSCULAR; INTRAVENOUS
Status: COMPLETED | OUTPATIENT
Start: 2024-12-16 | End: 2024-12-16

## 2024-12-16 RX ORDER — METHYLPREDNISOLONE 4 MG/1
TABLET ORAL
Qty: 21 EACH | Refills: 0 | Status: SHIPPED | OUTPATIENT
Start: 2024-12-16 | End: 2025-01-06

## 2024-12-16 RX ORDER — SUCRALFATE 1 G/1
1 TABLET ORAL 4 TIMES DAILY
COMMUNITY
Start: 2024-11-25

## 2024-12-16 RX ORDER — DEXAMETHASONE SODIUM PHOSPHATE 4 MG/ML
4 INJECTION, SOLUTION INTRA-ARTICULAR; INTRALESIONAL; INTRAMUSCULAR; INTRAVENOUS; SOFT TISSUE
Status: COMPLETED | OUTPATIENT
Start: 2024-12-16 | End: 2024-12-16

## 2024-12-16 RX ORDER — DORZOLAMIDE HCL 20 MG/ML
1 SOLUTION/ DROPS OPHTHALMIC 2 TIMES DAILY
COMMUNITY
Start: 2024-11-25

## 2024-12-16 RX ADMIN — CEFTRIAXONE 1 G: 1 INJECTION, POWDER, FOR SOLUTION INTRAMUSCULAR; INTRAVENOUS at 03:12

## 2024-12-16 RX ADMIN — DEXAMETHASONE SODIUM PHOSPHATE 4 MG: 4 INJECTION, SOLUTION INTRA-ARTICULAR; INTRALESIONAL; INTRAMUSCULAR; INTRAVENOUS; SOFT TISSUE at 03:12

## 2024-12-16 NOTE — PROGRESS NOTES
KATHIE Finnegan   [facility] [address] [place] [address]  [phone]      PATIENT NAME: Mack Moeller  : 1950  DATE: 24  MRN: [id number]      Billing Provider: KATHIE Finnegan  Level of Service:   Patient PCP Information       Provider PCP Type    KATHIE Finnegan General                Medications and Allergies     Medications  Outpatient Medications Marked as Taking for the 24 encounter (Office Visit) with Shanta Cason FNP   Medication Sig Dispense Refill    allopurinoL (ZYLOPRIM) 100 MG tablet Take 1 tablet (100 mg total) by mouth 2 (two) times daily. 180 tablet 1    ascorbic acid, vitamin C, (VITAMIN C) 500 MG tablet Take 500 mg by mouth once daily.      aspirin (ECOTRIN) 81 MG EC tablet Take 81 mg by mouth once daily.      atorvastatin (LIPITOR) 80 MG tablet Take 1 tablet (80 mg total) by mouth once daily. 90 tablet 1    brimonidine 0.2% (ALPHAGAN) 0.2 % Drop Place into both eyes.      cetirizine (ZYRTEC) 10 MG tablet Take 1 tablet (10 mg total) by mouth once daily. 90 tablet 1    cholecalciferol, vitamin D3, (VITAMIN D3) 50 mcg (2,000 unit) Cap Take 1 capsule by mouth once daily.      cyanocobalamin 2000 MCG tablet Take 2,000 mcg by mouth once daily.      dorzolamide (TRUSOPT) 2 % ophthalmic solution Place 1 drop into both eyes 2 (two) times daily.      famotidine (PEPCID) 20 MG tablet TAKE 1 TABLET DAILY 90 tablet 3    folic acid (FOLVITE) 1 MG tablet Take 1 mg by mouth once daily.      ketorolac 0.5% (ACULAR) 0.5 % Drop Place 1 drop into both eyes 4 (four) times daily as needed.      krill-om-3-dha-epa-phospho-ast (MEGARED OMEGA-3 KRILL OIL) 662-420-35-64 mg Cap Take 1 tablet by mouth once daily.      methotrexate 2.5 MG Tab Take 2.5 mg by mouth every 7 days.      metoprolol succinate (TOPROL-XL) 25 MG 24 hr tablet TAKE 1/2 (ONE-HALF) TABLET BY MOUTH IN THE MORNING AND 1 AT BEDTIME 180 tablet 1    sucralfate  (CARAFATE) 1 gram tablet Take 1 g by mouth 4 (four) times daily.       Current Facility-Administered Medications for the 12/16/24 encounter (Office Visit) with Shanta Cason FNP   Medication Dose Route Frequency Provider Last Rate Last Admin    [COMPLETED] cefTRIAXone injection 1 g  1 g Intramuscular 1 time in Clinic/HOD Shanta CasonKATHIE   1 g at 12/16/24 1523    [COMPLETED] dexAMETHasone injection 4 mg  4 mg Intramuscular 1 time in Clinic/HOD Shanta CasonKATHIE   4 mg at 12/16/24 1524       Allergies  Review of patient's allergies indicates:  No Known Allergies    History of Present Illness    CHIEF COMPLAINT:  Patient presents today with a sore throat and headache.    HISTORY OF PRESENT ILLNESS:  He reports severe sore throat and headache with maxillary tenderness and sinus pressure that began Saturday afternoon after mowing leaves. He experiences facial pain and redness predominantly on one side, along with ear discomfort. He has sinus infection symptoms.    RESPIRATORY:  He reports excessive coughing but denies significant daytime or nighttime cough. The cough is non-productive.    ALLERGIES:  He denies any allergies.      ROS:  General: -fever, -chills, +fatigue, -weight gain, -weight loss  Eyes: -vision changes, -redness, -discharge  ENT: +ear pain, +nasal congestion, +sore throat, +sinus pressure  Cardiovascular: -chest pain, -palpitations, -lower extremity edema  Respiratory: +cough, -shortness of breath  Gastrointestinal: -abdominal pain, -nausea, -vomiting, -diarrhea, -constipation, -blood in stool  Genitourinary: -dysuria, -hematuria, -frequency  Musculoskeletal: -joint pain, -muscle pain  Skin: -rash, -lesion  Neurological: +headache, -dizziness, -numbness, -tingling  Psychiatric: -anxiety, -depression, -sleep difficulty  Allergic: -allergic reactions          Physical Exam    General: No acute distress. Well-developed. Well-nourished.  Eyes: EOMI. Sclerae anicteric.  HENT: Normocephalic.  Atraumatic. Nares patent. Moist oral mucosa. Erythema of posterior pharynx. Maxillary tenderness.  Ears: Erythema of bilateral tympanic membranes.   Cardiovascular: Regular rate. Regular rhythm. No murmurs. No rubs. No gallops.   Respiratory: Normal respiratory effort. Clear to auscultation bilaterally. No rales. No rhonchi. No wheezing.  Abdomen: Soft. Non-tender. Non-distended. Normoactive bowel sounds.  Musculoskeletal: No  obvious deformity.  Extremities: No lower extremity edema.  Neurological: Alert & oriented x3. No slurred speech. Normal gait.  Psychiatric: Normal mood. Normal affect. Good insight. Good judgment.  Skin: Warm. Dry. No rash.          Assessment & Plan    IMPRESSION:  - Assessed patient with sore throat, headache, facial soreness, and cough  - Determined likely sinus infection due to duration of symptoms  - Decided on antibiotic and steroid to address infection and inflammation    SINUS INFECTION:  - Diagnosed the patient with a sinus infection based on reported symptoms and physical exam.  - Administered Rocephin and Decadron injections in the office.  - Prescribed Zithromax for a few days and a Medrol Dosepak.  - Recommend OTC Coricidin HBP for congestion and cough.  - Noted that patient reports sore throat, severe headache, and unilateral facial soreness, with symptoms onset on Saturday afternoon.  - Observed redness in the throat, maxillary tenderness, and sinus pressure during exam.    HEADACHE:  - Noted that patient reports a severe headache.  - Observed sinus headaches during physical exam.  - Prescribed a Medrol Dosepak, which may alleviate headache symptoms.    EAR PAIN/POTENTIAL EAR INFECTION:  - Noted that patient reports mild unilateral ear pain.  - Observed redness in both tympanic membranes during exam.  - Prescribed a Medrol Dosepak, which may help reduce ear inflammation.  - Noted that patient reports mild unilateral ear pain without significant ear congestion.  - Prescribed  antibiotics (Rocephin and Zithromax) and a Medrol Dosepak to address potential ear infection.    OTHER INSTRUCTIONS:  - Patient can resume outdoor activities (deer hunting) as tolerated.    FOLLOW UP:  - Instructed the patient to contact the office in the next few days if symptoms persist or worsen.          Health Maintenance Due   Topic Date Due    Shingles Vaccine (1 of 2) Never done    RSV Vaccine (Age 60+ and Pregnant patients) (1 - 1-dose 75+ series) Never done    COVID-19 Vaccine (4 - 2024-25 season) 09/01/2024       Problem List Items Addressed This Visit    None  Visit Diagnoses       Pansinusitis, unspecified chronicity    -  Primary    Relevant Medications    cefTRIAXone injection 1 g (Completed)    dexAMETHasone injection 4 mg (Completed)    azithromycin (Z-CHIRAG) 250 MG tablet    methylPREDNISolone (MEDROL DOSEPACK) 4 mg tablet    Acute cough        Relevant Medications    dexAMETHasone injection 4 mg (Completed)    methylPREDNISolone (MEDROL DOSEPACK) 4 mg tablet    Other Relevant Orders    POCT COVID-19 Rapid Screening (Completed)    POCT Influenza A/B Molecular (Completed)    Sore throat        Relevant Medications    dexAMETHasone injection 4 mg (Completed)    methylPREDNISolone (MEDROL DOSEPACK) 4 mg tablet    Other Relevant Orders    POCT Strep A, Molecular (Completed)            Health Maintenance Topics with due status: Not Due       Topic Last Completion Date    TETANUS VACCINE 06/16/2022    PROSTATE-SPECIFIC ANTIGEN 04/11/2024    Lipid Panel 10/14/2024       No future appointments.     This note was generated with the assistance of ambient listening technology. Verbal consent was obtained by the patient and accompanying visitor(s) for the recording of patient appointment to facilitate this note. I attest to having reviewed and edited the generated note for accuracy, though some syntax or spelling errors may persist. Please contact the author of this note for any clarification.     Signature:   KATHIE Finnegan  [facility] [address] [place] [address]  [phone]    Date of encounter: 12/16/24

## 2024-12-23 DIAGNOSIS — M10.9 GOUT, UNSPECIFIED CAUSE, UNSPECIFIED CHRONICITY, UNSPECIFIED SITE: ICD-10-CM

## 2024-12-23 RX ORDER — ALLOPURINOL 100 MG/1
100 TABLET ORAL 2 TIMES DAILY
Qty: 180 TABLET | Refills: 1 | Status: SHIPPED | OUTPATIENT
Start: 2024-12-23

## 2024-12-23 RX ORDER — ALLOPURINOL 100 MG/1
100 TABLET ORAL 2 TIMES DAILY
Qty: 180 TABLET | Refills: 1 | Status: SHIPPED | OUTPATIENT
Start: 2024-12-23 | End: 2024-12-23 | Stop reason: SDUPTHER

## 2025-01-21 ENCOUNTER — OFFICE VISIT (OUTPATIENT)
Dept: FAMILY MEDICINE | Facility: CLINIC | Age: 75
End: 2025-01-21
Payer: MEDICARE

## 2025-01-21 ENCOUNTER — APPOINTMENT (OUTPATIENT)
Dept: RADIOLOGY | Facility: CLINIC | Age: 75
End: 2025-01-21
Attending: NURSE PRACTITIONER
Payer: MEDICARE

## 2025-01-21 VITALS
WEIGHT: 205.38 LBS | HEART RATE: 79 BPM | BODY MASS INDEX: 27.82 KG/M2 | RESPIRATION RATE: 18 BRPM | OXYGEN SATURATION: 96 % | SYSTOLIC BLOOD PRESSURE: 120 MMHG | HEIGHT: 72 IN | TEMPERATURE: 98 F | DIASTOLIC BLOOD PRESSURE: 82 MMHG

## 2025-01-21 DIAGNOSIS — N64.4 BREAST TENDERNESS IN MALE: Primary | ICD-10-CM

## 2025-01-21 DIAGNOSIS — R07.9 ACUTE CHEST PAIN: ICD-10-CM

## 2025-01-21 DIAGNOSIS — E78.2 MIXED HYPERLIPIDEMIA: ICD-10-CM

## 2025-01-21 DIAGNOSIS — Z95.0 S/P PLACEMENT OF CARDIAC PACEMAKER: ICD-10-CM

## 2025-01-21 PROCEDURE — 71046 X-RAY EXAM CHEST 2 VIEWS: CPT | Mod: TC,RHCUB,FY | Performed by: NURSE PRACTITIONER

## 2025-01-21 PROCEDURE — 99213 OFFICE O/P EST LOW 20 MIN: CPT | Mod: ,,, | Performed by: NURSE PRACTITIONER

## 2025-01-21 RX ORDER — SULFAMETHOXAZOLE AND TRIMETHOPRIM 800; 160 MG/1; MG/1
1 TABLET ORAL 2 TIMES DAILY
Qty: 20 TABLET | Refills: 0 | Status: SHIPPED | OUTPATIENT
Start: 2025-01-21 | End: 2025-01-31

## 2025-01-21 RX ORDER — ATORVASTATIN CALCIUM 80 MG/1
80 TABLET, FILM COATED ORAL DAILY
Qty: 90 TABLET | Refills: 1 | Status: SHIPPED | OUTPATIENT
Start: 2025-01-21 | End: 2026-01-21

## 2025-01-21 RX ORDER — SULFAMETHOXAZOLE AND TRIMETHOPRIM 800; 160 MG/1; MG/1
1 TABLET ORAL 2 TIMES DAILY
Qty: 20 TABLET | Refills: 0 | Status: SHIPPED | OUTPATIENT
Start: 2025-01-21 | End: 2025-01-21 | Stop reason: SDUPTHER

## 2025-01-21 NOTE — PROGRESS NOTES
Discussed results with pt.  Verbalized understanding.  No questions or concerns stated at this time.

## 2025-01-21 NOTE — PROGRESS NOTES
KATHIE Finnegan   [facility] [address] [place] [address]  [phone]      PATIENT NAME: Mack Moeller  : 1950  DATE: 25  MRN: [id number]      Billing Provider: KATHIE Finnegan  Level of Service:   Patient PCP Information       Provider PCP Type    KATHIE Finnegan General                Medications and Allergies     Medications  Outpatient Medications Marked as Taking for the 25 encounter (Office Visit) with Shanta Cason FNP   Medication Sig Dispense Refill    allopurinoL (ZYLOPRIM) 100 MG tablet Take 1 tablet (100 mg total) by mouth 2 (two) times daily. 180 tablet 1    ascorbic acid, vitamin C, (VITAMIN C) 500 MG tablet Take 500 mg by mouth once daily.      aspirin (ECOTRIN) 81 MG EC tablet Take 81 mg by mouth once daily.      brimonidine 0.2% (ALPHAGAN) 0.2 % Drop Place into both eyes.      cetirizine (ZYRTEC) 10 MG tablet Take 1 tablet (10 mg total) by mouth once daily. 90 tablet 1    cholecalciferol, vitamin D3, (VITAMIN D3) 50 mcg (2,000 unit) Cap Take 1 capsule by mouth once daily.      cyanocobalamin 2000 MCG tablet Take 2,000 mcg by mouth once daily.      dorzolamide (TRUSOPT) 2 % ophthalmic solution Place 1 drop into both eyes 2 (two) times daily.      famotidine (PEPCID) 20 MG tablet TAKE 1 TABLET DAILY 90 tablet 3    folic acid (FOLVITE) 1 MG tablet Take 1 mg by mouth once daily.      ketorolac 0.5% (ACULAR) 0.5 % Drop Place 1 drop into both eyes 4 (four) times daily as needed.      krill-om-3-dha-epa-phospho-ast (MEGARED OMEGA-3 KRILL OIL) 230-045-49-64 mg Cap Take 1 tablet by mouth once daily.      methotrexate 2.5 MG Tab Take 2.5 mg by mouth every 7 days.      metoprolol succinate (TOPROL-XL) 25 MG 24 hr tablet TAKE 1/2 (ONE-HALF) TABLET BY MOUTH IN THE MORNING AND 1 AT BEDTIME 180 tablet 1    sucralfate (CARAFATE) 1 gram tablet Take 1 g by mouth 4 (four) times daily.      [DISCONTINUED] atorvastatin  (LIPITOR) 80 MG tablet Take 1 tablet (80 mg total) by mouth once daily. 90 tablet 1       Allergies  Review of patient's allergies indicates:  No Known Allergies    History of Present Illness    CHIEF COMPLAINT:  Patient presents today with soreness near left breast, lateral nipple area.    PACEMAKER SITE CONCERNS:  He reports discomfort at the edge of pacemaker site for approximately three weeks. His spouse notes possible pacemaker shift. Contributing factors may include sleeping on the affected side and recent physical activity involving lifting deer corn.    HAND PAIN:  He reports significant improvement in hand pain following steroid treatment from previous visit. Currently experiences mild hand discomfort, specifically noting soreness along the edge of the hand. Prior to treatment, hand pain was interfering with sleep and required braces and wax soaks for management. Improvement was noted the day after steroid administration.      ROS:  General: -fever, -chills, -fatigue, -weight gain, -weight loss  Eyes: -vision changes, -redness, -discharge  ENT: -ear pain, -nasal congestion, -sore throat  Cardiovascular: -chest pain, -palpitations, -lower extremity edema  Respiratory: -cough, -shortness of breath  Gastrointestinal: -abdominal pain, -nausea, -vomiting, -diarrhea, -constipation, -blood in stool  Genitourinary: -dysuria, -hematuria, -frequency  Musculoskeletal: -joint pain, +muscle pain, + tenderness to left breast area  Skin: -rash, -lesion  Neurological: -headache, -dizziness, -numbness, -tingling  Psychiatric: -anxiety, -depression, -sleep difficulty          Physical Exam    General: No acute distress. Well-developed. Well-nourished.  Eyes: EOMI. Sclerae anicteric.  HENT: Normocephalic. Atraumatic.   Cardiovascular: Regular rate. Regular rhythm. Firm area palpated near nipple. Tenderness at site.  Respiratory: Normal respiratory effort. Clear to auscultation bilaterally.   Abdomen: Soft. Non-tender.  Non-distended. Normoactive bowel sounds.  Musculoskeletal: No  obvious deformity.  Extremities: No lower extremity edema.  Neurological: Alert & oriented x3. No slurred speech. Normal gait.  Psychiatric: Normal mood. Normal affect. Good insight. Good judgment.  Skin: Warm. Dry. No rash. No erythema at pacemaker site.          Assessment & Plan    IMPRESSION:  - Assessed patient's complaint of sore spot left breast, near nipple  - Considered possibility of pacemaker-related issue or scar tissue formation  - Will order ultrasound to evaluate tissue near area  - Suspected potential inflammation of glands near nipple  - Opted for antibiotic treatment as precautionary measure  - Reviewed previous treatment with steroid Dosepak for hand issue, noting significant improvement    RHEUMATOID ARTHRITIS OF OTHER SITE, UNSPECIFIED WHETHER RHEUMATOID FACTOR PRESENT:  - Noted improvement in hand pain and function after previous treatment with steroid Dosepak.  - Plan to inform rheumatologist about the effectiveness of this treatment.    VENTRICULAR TACHYCARDIA, UNSPECIFIED:  - Patient reported soreness near pacemaker site for about 3 weeks.  - Physical exam revealed a hard spot without significant tenderness or redness.  - Chest XR showed pacemaker in expected location with no other significant findings.  - Suspected pacemaker-related soreness; ordered ultrasound for further evaluation.  - Prescribed antibiotics as precaution for potential gland inflammation near nipple.  - Plan to follow up after receiving ultrasound results.  - Noted upcoming routine cardiology appointment in March.    PACEMAKER-RELATED SORENESS:  - Discussed potential causes of soreness, including possible pacemaker-related issues or scar tissue formation.  - Plan to follow up when contacted with ultrasound results, scheduled for Jan 24th @ [facility].  - Recommend acetaminophen for pain relief.    POTENTIAL GLAND INFLAMMATION:  - Reviewed the possibility  of gland inflammation/infection and the rationale for precautionary antibiotic treatment.    HAND PAIN:  - Reviewed previous treatment for hand pain and noted improvement after treatment.     Follow up depending upon ultrasound results.         Health Maintenance Due   Topic Date Due    Shingles Vaccine (1 of 2) Never done    RSV Vaccine (Age 60+ and Pregnant patients) (1 - 1-dose 75+ series) Never done    COVID-19 Vaccine (4 - 2024-25 season) 09/01/2024       Problem List Items Addressed This Visit          Cardiac/Vascular    Mixed hyperlipidemia    Relevant Medications    atorvastatin (LIPITOR) 80 MG tablet     Other Visit Diagnoses       Breast tenderness in male    -  Primary    Relevant Medications    sulfamethoxazole-trimethoprim 800-160mg (BACTRIM DS) 800-160 mg Tab    Other Relevant Orders    X-Ray Chest PA And Lateral (Completed)    US Abdomen Limited    S/P placement of cardiac pacemaker                Health Maintenance Topics with due status: Not Due       Topic Last Completion Date    TETANUS VACCINE 06/16/2022    PROSTATE-SPECIFIC ANTIGEN 04/11/2024    Lipid Panel 10/14/2024       Future Appointments   Date Time Provider Department Center   1/24/2025  8:30 AM [facility] [place] [facility]    3/13/2025  3:00 PM AWV NURSE, [facility] [place]        This note was generated with the assistance of ambient listening technology. Verbal consent was obtained by the patient and accompanying visitor(s) for the recording of patient appointment to facilitate this note. I attest to having reviewed and edited the generated note for accuracy, though some syntax or spelling errors may persist. Please contact the author of this note for any clarification.     Signature:  KATHIE Finnegan  [facility] [address] [place] [address]  [phone]    Date of encounter: 1/21/25

## 2025-01-24 ENCOUNTER — TELEPHONE (OUTPATIENT)
Dept: FAMILY MEDICINE | Facility: CLINIC | Age: 75
End: 2025-01-24
Payer: MEDICARE

## 2025-01-24 ENCOUNTER — HOSPITAL ENCOUNTER (OUTPATIENT)
Dept: RADIOLOGY | Facility: HOSPITAL | Age: 75
Discharge: HOME | End: 2025-01-24
Attending: NURSE PRACTITIONER
Payer: MEDICARE

## 2025-01-24 DIAGNOSIS — R10.84 ABDOMINAL PAIN, GENERALIZED: ICD-10-CM

## 2025-01-24 DIAGNOSIS — N64.4 BREAST TENDERNESS IN MALE: ICD-10-CM

## 2025-01-24 DIAGNOSIS — N64.4 BREAST TENDERNESS IN MALE: Primary | ICD-10-CM

## 2025-01-24 PROCEDURE — 76705 ECHO EXAM OF ABDOMEN: CPT | Mod: TC

## 2025-02-14 ENCOUNTER — HOSPITAL ENCOUNTER (OUTPATIENT)
Dept: RADIOLOGY | Facility: HOSPITAL | Age: 75
Discharge: HOME | End: 2025-02-14
Attending: NURSE PRACTITIONER
Payer: MEDICARE

## 2025-02-14 DIAGNOSIS — N64.4 BREAST TENDERNESS IN MALE: ICD-10-CM

## 2025-02-14 PROCEDURE — 77062 BREAST TOMOSYNTHESIS BI: CPT | Mod: 26,,, | Performed by: RADIOLOGY

## 2025-02-14 PROCEDURE — 76641 ULTRASOUND BREAST COMPLETE: CPT | Mod: TC,50

## 2025-02-14 PROCEDURE — 77062 BREAST TOMOSYNTHESIS BI: CPT | Mod: TC

## 2025-02-14 PROCEDURE — 77066 DX MAMMO INCL CAD BI: CPT | Mod: 26,,, | Performed by: RADIOLOGY

## 2025-02-17 DIAGNOSIS — E78.2 MIXED HYPERLIPIDEMIA: ICD-10-CM

## 2025-02-17 RX ORDER — ATORVASTATIN CALCIUM 80 MG/1
80 TABLET, FILM COATED ORAL DAILY
Qty: 90 TABLET | Refills: 1 | Status: SHIPPED | OUTPATIENT
Start: 2025-02-17 | End: 2026-02-17

## 2025-03-25 ENCOUNTER — OFFICE VISIT (OUTPATIENT)
Dept: FAMILY MEDICINE | Facility: CLINIC | Age: 75
End: 2025-03-25
Payer: MEDICARE

## 2025-03-25 VITALS
TEMPERATURE: 98 F | SYSTOLIC BLOOD PRESSURE: 136 MMHG | WEIGHT: 203 LBS | DIASTOLIC BLOOD PRESSURE: 70 MMHG | BODY MASS INDEX: 27.5 KG/M2 | RESPIRATION RATE: 16 BRPM | OXYGEN SATURATION: 95 % | HEIGHT: 72 IN | HEART RATE: 73 BPM

## 2025-03-25 DIAGNOSIS — Z00.00 ENCOUNTER FOR SUBSEQUENT ANNUAL WELLNESS VISIT (AWV) IN MEDICARE PATIENT: Primary | ICD-10-CM

## 2025-03-25 DIAGNOSIS — M19.90 ARTHRITIS PAIN: ICD-10-CM

## 2025-03-25 DIAGNOSIS — M79.10 MYALGIA: ICD-10-CM

## 2025-03-25 DIAGNOSIS — H54.0X55 BLINDNESS RIGHT EYE CATEGORY 5, BLINDNESS LEFT EYE CATEGORY 5: ICD-10-CM

## 2025-03-25 DIAGNOSIS — M05.79 SEROPOSITIVE RHEUMATOID ARTHRITIS OF MULTIPLE SITES: ICD-10-CM

## 2025-03-25 DIAGNOSIS — I10 PRIMARY HYPERTENSION: ICD-10-CM

## 2025-03-25 DIAGNOSIS — G57.93 NEUROPATHY INVOLVING BOTH LOWER EXTREMITIES: ICD-10-CM

## 2025-03-25 DIAGNOSIS — K21.9 GASTROESOPHAGEAL REFLUX DISEASE WITHOUT ESOPHAGITIS: ICD-10-CM

## 2025-03-25 DIAGNOSIS — M10.9 GOUT, UNSPECIFIED CAUSE, UNSPECIFIED CHRONICITY, UNSPECIFIED SITE: ICD-10-CM

## 2025-03-25 DIAGNOSIS — E78.2 MIXED HYPERLIPIDEMIA: ICD-10-CM

## 2025-03-25 PROCEDURE — G0439 PPPS, SUBSEQ VISIT: HCPCS | Mod: ,,, | Performed by: NURSE PRACTITIONER

## 2025-03-25 RX ORDER — CYCLOPENTOLATE HYDROCHLORIDE 10 MG/ML
1 SOLUTION/ DROPS OPHTHALMIC NIGHTLY
COMMUNITY
Start: 2025-03-20

## 2025-03-25 NOTE — PROGRESS NOTES
Mack Moeller presented for a  Medicare AWV and comprehensive Health Risk Assessment today. The following components were reviewed and updated:    Medical history  Family History  Social history  Allergies and Current Medications  Health Risk Assessment  Health Maintenance  Care Team         ** See Completed Assessments for Annual Wellness Visit within the encounter summary.**         The following assessments were completed:  Living Situation  CAGE  Depression Screening  Timed Get Up and Go  Whisper Test  Cognitive Function Screening  Nutrition Screening  ADL Screening  PAQ Screening          Opioid Documentation    does not have a current opioid prescription.       Vitals:    03/25/25 0913   BP: 136/70   BP Location: Left arm   Patient Position: Sitting   Pulse: 73   Resp: 16   Temp: 97.7 °F (36.5 °C)   SpO2: 95%   Weight: 92.1 kg (203 lb)   Height: 6' (1.829 m)     Body mass index is 27.53 kg/m².  Physical Exam  Constitutional:       General: He is not in acute distress.  HENT:      Head: Normocephalic.      Mouth/Throat:      Mouth: Mucous membranes are moist.   Cardiovascular:      Rate and Rhythm: Normal rate.   Pulmonary:      Effort: Pulmonary effort is normal. No respiratory distress.   Abdominal:      General: Bowel sounds are normal.      Palpations: Abdomen is soft.   Musculoskeletal:         General: Normal range of motion.      Cervical back: Neck supple.   Skin:     General: Skin is warm and dry.   Neurological:      Mental Status: He is alert and oriented to person, place, and time.               Diagnoses and health risks identified today and associated recommendations/orders:    Problem List Items Addressed This Visit          Neuro    Neuropathy involving both lower extremities       Ophtho    Blindness right eye category 5, blindness left eye category 5       Cardiac/Vascular    Primary hypertension    Condition is stable. Continue low sodium diet. Continue current plan.          Mixed  hyperlipidemia    Lab Results   Component Value Date    CHOL 129 10/14/2024    CHOL 143 04/11/2024    CHOL 119 10/10/2023     Lab Results   Component Value Date    HDL 34 (L) 10/14/2024    HDL 35 (L) 04/11/2024    HDL 36 (L) 10/10/2023     Lab Results   Component Value Date    LDLCALC 62 10/14/2024    LDLCALC 73 04/11/2024    LDLCALC 46 10/10/2023     Lab Results   Component Value Date    TRIG 165 (H) 10/14/2024    TRIG 173 (H) 04/11/2024    TRIG 185 (H) 10/10/2023       Lab Results   Component Value Date    CHOLHDL 3.8 10/14/2024    CHOLHDL 4.1 04/11/2024    CHOLHDL 3.3 10/10/2023     Continue current plan.            Immunology/Multi System    Seropositive rheumatoid arthritis of multiple sites       GI    Gastroesophageal reflux disease without esophagitis       Orthopedic    Myalgia    Arthritis pain    Gout     Other Visit Diagnoses         Encounter for subsequent annual wellness visit (AWV) in Medicare patient    -  Primary      BMI 27.0-27.9,adult                Provided Mack with a 5-10 year written screening schedule and personal prevention plan. Recommendations were developed using the USPSTF age appropriate recommendations. Education, counseling, and referrals were provided as needed. After Visit Summary printed and given to patient which includes a list of additional screenings\tests needed.    Patient declines vaccines at this time , will follow up with pharmacy at later date if desired.     Follow up for 1 year annual wellness.    KATHIE Finnegan      I offered to discuss advanced care planning, including how to pick a person who would make decisions for you if you were unable to make them for yourself, called a health care power of , and what kind of decisions you might make such as use of life sustaining treatments such as ventilators and tube feeding when faced with a life limiting illness recorded on a living will that they will need to know. (How you want to be cared for as you near  the end of your natural life)     X  Patient has advanced directives written and agrees to provide copies to the institution.

## 2025-03-25 NOTE — ASSESSMENT & PLAN NOTE
Lab Results   Component Value Date    CHOL 129 10/14/2024    CHOL 143 04/11/2024    CHOL 119 10/10/2023     Lab Results   Component Value Date    HDL 34 (L) 10/14/2024    HDL 35 (L) 04/11/2024    HDL 36 (L) 10/10/2023     Lab Results   Component Value Date    LDLCALC 62 10/14/2024    LDLCALC 73 04/11/2024    LDLCALC 46 10/10/2023     Lab Results   Component Value Date    TRIG 165 (H) 10/14/2024    TRIG 173 (H) 04/11/2024    TRIG 185 (H) 10/10/2023       Lab Results   Component Value Date    CHOLHDL 3.8 10/14/2024    CHOLHDL 4.1 04/11/2024    CHOLHDL 3.3 10/10/2023     Continue current plan.

## 2025-03-25 NOTE — PATIENT INSTRUCTIONS
Counseling and Referral of Other Preventative  (Italic type indicates deductible and co-insurance are waived)    Patient Name: Mack Moeller  Today's Date: 3/25/2025    Health Maintenance       Date Due Completion Date    Shingles Vaccine (1 of 2) Never done ---    RSV Vaccine (Age 60+ and Pregnant patients) (1 - 1-dose 75+ series) Never done ---    COVID-19 Vaccine (4 - 2024-25 season) 09/01/2024 11/18/2021    PROSTATE-SPECIFIC ANTIGEN 04/11/2025 4/11/2024    Override on 8/31/2020: Done (1.490)    Lipid Panel 10/14/2025 10/14/2024    TETANUS VACCINE 06/16/2032 6/16/2022        No orders of the defined types were placed in this encounter.      The following information is provided to all patients.  This information is to help you find resources for any of the problems found today that may be affecting your health:                  Living healthy guide: [place]    Understanding Diabetes: www.diabetes.org      Eating healthy: www.cdc.gov/healthyweight      [facility] home safety checklist: www.cdc.gov/steadi/patient.html      Agency on Aging: [place].gov    Alcoholics anonymous (AA): www.aa.org      Physical Activity: www.araceli.nih.gov/qu5lixt      Tobacco use: [place]      Counseling and Referral of Other Preventative  (Italic type indicates deductible and co-insurance are waived)    Patient Name: Mack Moeller  Today's Date: 3/25/2025    Health Maintenance       Date Due Completion Date    Shingles Vaccine (1 of 2) Never done ---    RSV Vaccine (Age 60+ and Pregnant patients) (1 - 1-dose 75+ series) Never done ---    COVID-19 Vaccine (4 - 2024-25 season) 09/01/2024 11/18/2021    PROSTATE-SPECIFIC ANTIGEN 04/11/2025 4/11/2024    Override on 8/31/2020: Done (1.490)    Lipid Panel 10/14/2025 10/14/2024    TETANUS VACCINE 06/16/2032 6/16/2022        No orders of the defined types were placed in this encounter.      The following information is provided to all patients.  This information is to help you find resources for any of the  problems found today that may be affecting your health:                  Living healthy guide: [place]    Understanding Diabetes: www.diabetes.org      Eating healthy: www.cdc.gov/healthyweight      CDC home safety checklist: www.cdc.gov/steadi/patient.html      Agency on Aging: [place].gov    Alcoholics anonymous (AA): www.aa.org      Physical Activity: www.araceli.nih.gov/gi6cvng      Tobacco use: [place].gov

## 2025-04-10 DIAGNOSIS — J30.9 ALLERGIC RHINITIS, UNSPECIFIED SEASONALITY, UNSPECIFIED TRIGGER: ICD-10-CM

## 2025-04-10 RX ORDER — CETIRIZINE HYDROCHLORIDE 10 MG/1
10 TABLET ORAL
Qty: 90 TABLET | Refills: 1 | Status: SHIPPED | OUTPATIENT
Start: 2025-04-10

## 2025-04-17 DIAGNOSIS — I10 PRIMARY HYPERTENSION: ICD-10-CM

## 2025-04-21 RX ORDER — METOPROLOL SUCCINATE 25 MG/1
TABLET, EXTENDED RELEASE ORAL
Qty: 90 TABLET | Refills: 0 | Status: SHIPPED | OUTPATIENT
Start: 2025-04-21

## 2025-06-02 DIAGNOSIS — M10.9 GOUT, UNSPECIFIED CAUSE, UNSPECIFIED CHRONICITY, UNSPECIFIED SITE: ICD-10-CM

## 2025-06-03 RX ORDER — ALLOPURINOL 100 MG/1
100 TABLET ORAL 2 TIMES DAILY
Qty: 180 TABLET | Refills: 3 | Status: SHIPPED | OUTPATIENT
Start: 2025-06-03 | End: 2025-06-05 | Stop reason: SDUPTHER

## 2025-06-05 ENCOUNTER — OFFICE VISIT (OUTPATIENT)
Dept: FAMILY MEDICINE | Facility: CLINIC | Age: 75
End: 2025-06-05
Payer: MEDICARE

## 2025-06-05 VITALS
TEMPERATURE: 98 F | DIASTOLIC BLOOD PRESSURE: 82 MMHG | BODY MASS INDEX: 28.17 KG/M2 | WEIGHT: 208 LBS | HEART RATE: 84 BPM | SYSTOLIC BLOOD PRESSURE: 130 MMHG | RESPIRATION RATE: 18 BRPM | OXYGEN SATURATION: 96 % | HEIGHT: 72 IN

## 2025-06-05 DIAGNOSIS — I10 PRIMARY HYPERTENSION: Primary | ICD-10-CM

## 2025-06-05 DIAGNOSIS — M10.9 GOUT, UNSPECIFIED CAUSE, UNSPECIFIED CHRONICITY, UNSPECIFIED SITE: ICD-10-CM

## 2025-06-05 DIAGNOSIS — Z95.810 PRESENCE OF CARDIAC DEFIBRILLATOR: ICD-10-CM

## 2025-06-05 DIAGNOSIS — I47.20 VENTRICULAR TACHYCARDIA, UNSPECIFIED: ICD-10-CM

## 2025-06-05 DIAGNOSIS — E55.9 VITAMIN D DEFICIENCY: ICD-10-CM

## 2025-06-05 DIAGNOSIS — M25.532 LEFT WRIST PAIN: ICD-10-CM

## 2025-06-05 DIAGNOSIS — E78.2 MIXED HYPERLIPIDEMIA: ICD-10-CM

## 2025-06-05 DIAGNOSIS — Z12.5 ENCOUNTER FOR SCREENING FOR MALIGNANT NEOPLASM OF PROSTATE: ICD-10-CM

## 2025-06-05 DIAGNOSIS — M54.9 ACUTE LEFT-SIDED BACK PAIN, UNSPECIFIED BACK LOCATION: ICD-10-CM

## 2025-06-05 PROBLEM — Z95.0 PRESENCE OF CARDIAC PACEMAKER: Status: RESOLVED | Noted: 2025-06-05 | Resolved: 2025-06-05

## 2025-06-05 PROBLEM — Z95.0 PRESENCE OF CARDIAC PACEMAKER: Status: ACTIVE | Noted: 2025-06-05

## 2025-06-05 LAB
25(OH)D3 SERPL-MCNC: 34.2 NG/ML (ref 30–80)
ALBUMIN SERPL BCP-MCNC: 3.8 G/DL (ref 3.4–4.8)
ALBUMIN/GLOB SERPL: 1.4 {RATIO}
ALP SERPL-CCNC: 86 U/L (ref 40–150)
ALT SERPL W P-5'-P-CCNC: 15 U/L
ANION GAP SERPL CALCULATED.3IONS-SCNC: 8 MMOL/L (ref 7–16)
AST SERPL W P-5'-P-CCNC: 39 U/L (ref 11–45)
BASOPHILS # BLD AUTO: 0.03 K/UL (ref 0–0.2)
BASOPHILS NFR BLD AUTO: 0.4 % (ref 0–1)
BILIRUB SERPL-MCNC: 1.1 MG/DL
BUN SERPL-MCNC: 14 MG/DL (ref 8–26)
BUN/CREAT SERPL: 14 (ref 6–20)
CALCIUM SERPL-MCNC: 8.9 MG/DL (ref 8.8–10)
CHLORIDE SERPL-SCNC: 112 MMOL/L (ref 98–107)
CHOLEST SERPL-MCNC: 112 MG/DL
CHOLEST/HDLC SERPL: 4.7 {RATIO}
CO2 SERPL-SCNC: 24 MMOL/L (ref 23–31)
CREAT SERPL-MCNC: 1.03 MG/DL (ref 0.72–1.25)
DIFFERENTIAL METHOD BLD: ABNORMAL
EGFR (NO RACE VARIABLE) (RUSH/TITUS): 75 ML/MIN/1.73M2
EOSINOPHIL # BLD AUTO: 0.26 K/UL (ref 0–0.5)
EOSINOPHIL NFR BLD AUTO: 3.7 % (ref 1–4)
ERYTHROCYTE [DISTWIDTH] IN BLOOD BY AUTOMATED COUNT: 13.8 % (ref 11.5–14.5)
EST. AVERAGE GLUCOSE BLD GHB EST-MCNC: 108 MG/DL
FOLATE SERPL-MCNC: 14.1 NG/ML (ref 7–31.4)
GLOBULIN SER-MCNC: 2.8 G/DL (ref 2–4)
GLUCOSE SERPL-MCNC: 98 MG/DL (ref 82–115)
HBA1C MFR BLD HPLC: 5.4 %
HCT VFR BLD AUTO: 45.5 % (ref 40–54)
HDLC SERPL-MCNC: 24 MG/DL (ref 35–60)
HGB BLD-MCNC: 15 G/DL (ref 13.5–18)
IMM GRANULOCYTES # BLD AUTO: 0.04 K/UL (ref 0–0.04)
IMM GRANULOCYTES NFR BLD: 0.6 % (ref 0–0.4)
LDLC SERPL CALC-MCNC: 54 MG/DL
LDLC/HDLC SERPL: 2.3 {RATIO}
LYMPHOCYTES # BLD AUTO: 1.56 K/UL (ref 1–4.8)
LYMPHOCYTES NFR BLD AUTO: 22.5 % (ref 27–41)
MCH RBC QN AUTO: 32.2 PG (ref 27–31)
MCHC RBC AUTO-ENTMCNC: 33 G/DL (ref 32–36)
MCV RBC AUTO: 97.6 FL (ref 80–96)
MONOCYTES # BLD AUTO: 0.72 K/UL (ref 0–0.8)
MONOCYTES NFR BLD AUTO: 10.4 % (ref 2–6)
MPC BLD CALC-MCNC: 11.2 FL (ref 9.4–12.4)
NEUTROPHILS # BLD AUTO: 4.33 K/UL (ref 1.8–7.7)
NEUTROPHILS NFR BLD AUTO: 62.4 % (ref 53–65)
NONHDLC SERPL-MCNC: 88 MG/DL
NRBC # BLD AUTO: 0 X10E3/UL
NRBC, AUTO (.00): 0 %
PLATELET # BLD AUTO: 136 K/UL (ref 150–400)
POTASSIUM SERPL-SCNC: 3.9 MMOL/L (ref 3.5–5.1)
PROT SERPL-MCNC: 6.6 G/DL (ref 5.8–7.6)
PSA SERPL-MCNC: 0.8 NG/ML
RBC # BLD AUTO: 4.66 M/UL (ref 4.6–6.2)
SODIUM SERPL-SCNC: 140 MMOL/L (ref 136–145)
TRIGL SERPL-MCNC: 171 MG/DL (ref 34–140)
VIT B12 SERPL-MCNC: 442 PG/ML (ref 213–816)
VLDLC SERPL-MCNC: 34 MG/DL
WBC # BLD AUTO: 6.94 K/UL (ref 4.5–11)

## 2025-06-05 RX ORDER — ALLOPURINOL 100 MG/1
100 TABLET ORAL 2 TIMES DAILY
Qty: 180 TABLET | Refills: 3 | Status: SHIPPED | OUTPATIENT
Start: 2025-06-05

## 2025-06-05 RX ORDER — DEXAMETHASONE SODIUM PHOSPHATE 4 MG/ML
4 INJECTION, SOLUTION INTRA-ARTICULAR; INTRALESIONAL; INTRAMUSCULAR; INTRAVENOUS; SOFT TISSUE
Status: COMPLETED | OUTPATIENT
Start: 2025-06-05 | End: 2025-06-05

## 2025-06-05 RX ADMIN — DEXAMETHASONE SODIUM PHOSPHATE 4 MG: 4 INJECTION, SOLUTION INTRA-ARTICULAR; INTRALESIONAL; INTRAMUSCULAR; INTRAVENOUS; SOFT TISSUE at 08:06

## 2025-06-09 ENCOUNTER — RESULTS FOLLOW-UP (OUTPATIENT)
Dept: FAMILY MEDICINE | Facility: CLINIC | Age: 75
End: 2025-06-09

## 2025-06-16 RX ORDER — FOLIC ACID 1 MG/1
1 TABLET ORAL DAILY
Qty: 90 TABLET | Refills: 1 | Status: SHIPPED | OUTPATIENT
Start: 2025-06-16

## 2025-06-16 NOTE — TELEPHONE ENCOUNTER
----- Message from Nayana sent at 6/16/2025  8:14 AM CDT -----  Patent wife called and said that the patient is needing refills on folic acid (FOLVITE) 1 MG tablet    Sent to [facility] in [place]

## 2025-07-11 ENCOUNTER — TELEPHONE (OUTPATIENT)
Dept: FAMILY MEDICINE | Facility: CLINIC | Age: 75
End: 2025-07-11
Payer: MEDICARE

## 2025-07-11 NOTE — TELEPHONE ENCOUNTER
VO Metoprolol Succinate 25mg  1/2 tablet in the morning and 1 tablet at bedtime.  #30 with no refills.  [facility].

## 2025-08-07 DIAGNOSIS — I10 PRIMARY HYPERTENSION: ICD-10-CM

## 2025-08-07 RX ORDER — METOPROLOL SUCCINATE 25 MG/1
25 TABLET, EXTENDED RELEASE ORAL DAILY
Qty: 90 TABLET | Refills: 1 | Status: SHIPPED | OUTPATIENT
Start: 2025-08-07

## 2025-08-07 RX ORDER — METOPROLOL SUCCINATE 25 MG/1
TABLET, EXTENDED RELEASE ORAL
Qty: 90 TABLET | Refills: 1 | Status: SHIPPED | OUTPATIENT
Start: 2025-08-07 | End: 2025-08-07 | Stop reason: SDUPTHER

## 2025-08-18 DIAGNOSIS — E78.2 MIXED HYPERLIPIDEMIA: ICD-10-CM

## 2025-08-18 DIAGNOSIS — I10 PRIMARY HYPERTENSION: ICD-10-CM

## 2025-08-18 RX ORDER — METOPROLOL SUCCINATE 25 MG/1
25 TABLET, EXTENDED RELEASE ORAL DAILY
Qty: 90 TABLET | Refills: 1 | Status: SHIPPED | OUTPATIENT
Start: 2025-08-18

## 2025-08-18 RX ORDER — ATORVASTATIN CALCIUM 80 MG/1
80 TABLET, FILM COATED ORAL DAILY
Qty: 90 TABLET | Refills: 1 | Status: SHIPPED | OUTPATIENT
Start: 2025-08-18 | End: 2026-08-18